# Patient Record
Sex: FEMALE | Race: WHITE | NOT HISPANIC OR LATINO | Employment: OTHER | URBAN - METROPOLITAN AREA
[De-identification: names, ages, dates, MRNs, and addresses within clinical notes are randomized per-mention and may not be internally consistent; named-entity substitution may affect disease eponyms.]

---

## 2018-10-19 DIAGNOSIS — R00.2 PALPITATION: Primary | ICD-10-CM

## 2018-10-19 RX ORDER — PROPRANOLOL HYDROCHLORIDE 40 MG/1
TABLET ORAL
Qty: 60 TABLET | Refills: 0 | Status: SHIPPED | OUTPATIENT
Start: 2018-10-19 | End: 2018-11-21 | Stop reason: SDUPTHER

## 2018-10-23 DIAGNOSIS — R00.2 PALPITATION: ICD-10-CM

## 2018-10-23 RX ORDER — PROPRANOLOL HYDROCHLORIDE 40 MG/1
40 TABLET ORAL 2 TIMES DAILY
Qty: 60 TABLET | Refills: 0 | Status: CANCELLED | OUTPATIENT
Start: 2018-10-23

## 2018-11-21 DIAGNOSIS — R00.2 PALPITATION: ICD-10-CM

## 2018-11-21 RX ORDER — PROPRANOLOL HYDROCHLORIDE 40 MG/1
TABLET ORAL
Qty: 60 TABLET | Refills: 0 | Status: SHIPPED | OUTPATIENT
Start: 2018-11-21 | End: 2018-12-19 | Stop reason: SDUPTHER

## 2018-12-19 DIAGNOSIS — R00.2 PALPITATION: ICD-10-CM

## 2018-12-19 RX ORDER — PROPRANOLOL HYDROCHLORIDE 40 MG/1
TABLET ORAL
Qty: 60 TABLET | Refills: 0 | Status: SHIPPED | OUTPATIENT
Start: 2018-12-19 | End: 2019-01-22 | Stop reason: SDUPTHER

## 2018-12-24 DIAGNOSIS — F41.9 ANXIETY: Primary | ICD-10-CM

## 2018-12-24 RX ORDER — SERTRALINE HYDROCHLORIDE 100 MG/1
100 TABLET, FILM COATED ORAL DAILY
Qty: 30 TABLET | Refills: 6 | Status: SHIPPED | OUTPATIENT
Start: 2018-12-24 | End: 2019-01-25 | Stop reason: SDUPTHER

## 2018-12-24 RX ORDER — SERTRALINE HYDROCHLORIDE 100 MG/1
100 TABLET, FILM COATED ORAL DAILY
Refills: 1 | COMMUNITY
Start: 2018-09-29 | End: 2018-12-24 | Stop reason: SDUPTHER

## 2018-12-24 NOTE — TELEPHONE ENCOUNTER
Patient called she said she needs a Rx for Zoloft 100 mg called into [facility] at [phone] pt of santiago HUERTA

## 2019-01-22 DIAGNOSIS — R00.2 PALPITATION: ICD-10-CM

## 2019-01-22 RX ORDER — PROPRANOLOL HYDROCHLORIDE 40 MG/1
TABLET ORAL
Qty: 60 TABLET | Refills: 0 | Status: SHIPPED | OUTPATIENT
Start: 2019-01-22 | End: 2019-01-25 | Stop reason: SDUPTHER

## 2019-01-22 NOTE — TELEPHONE ENCOUNTER
Please call her and advise that this is last rx until seen   Last appt that I see was 2014     I have been sending with rx but still keeps sending

## 2019-01-25 ENCOUNTER — OFFICE VISIT (OUTPATIENT)
Dept: FAMILY MEDICINE CLINIC | Facility: CLINIC | Age: 59
End: 2019-01-25
Payer: MEDICARE

## 2019-01-25 VITALS
BODY MASS INDEX: 18.77 KG/M2 | DIASTOLIC BLOOD PRESSURE: 80 MMHG | WEIGHT: 126.7 LBS | RESPIRATION RATE: 18 BRPM | HEIGHT: 69 IN | SYSTOLIC BLOOD PRESSURE: 130 MMHG | HEART RATE: 74 BPM

## 2019-01-25 DIAGNOSIS — F41.9 ANXIETY: ICD-10-CM

## 2019-01-25 DIAGNOSIS — R00.2 HEART PALPITATIONS: ICD-10-CM

## 2019-01-25 DIAGNOSIS — F17.200 TOBACCO DEPENDENCE: ICD-10-CM

## 2019-01-25 DIAGNOSIS — R00.2 PALPITATION: ICD-10-CM

## 2019-01-25 DIAGNOSIS — Z00.00 ANNUAL PHYSICAL EXAM: Primary | ICD-10-CM

## 2019-01-25 PROCEDURE — G0439 PPPS, SUBSEQ VISIT: HCPCS | Performed by: NURSE PRACTITIONER

## 2019-01-25 RX ORDER — SERTRALINE HYDROCHLORIDE 100 MG/1
100 TABLET, FILM COATED ORAL DAILY
Qty: 30 TABLET | Refills: 5 | Status: SHIPPED | OUTPATIENT
Start: 2019-01-25 | End: 2019-03-25 | Stop reason: SDUPTHER

## 2019-01-25 RX ORDER — PROPRANOLOL HYDROCHLORIDE 40 MG/1
40 TABLET ORAL 2 TIMES DAILY
Qty: 60 TABLET | Refills: 5 | Status: SHIPPED | OUTPATIENT
Start: 2019-01-25 | End: 2019-07-17 | Stop reason: SDUPTHER

## 2019-01-25 NOTE — PATIENT INSTRUCTIONS

## 2019-01-25 NOTE — PROGRESS NOTES
ADULT ANNUAL PHYSICAL  [facility]    NAME: Collette Vaughn  AGE: 60 y o  SEX: female  : 1960     DATE: 2019     Assessment and Plan:     Problem List Items Addressed This Visit     Anxiety    Heart palpitations      Other Visit Diagnoses     Annual physical exam    -  Primary    Tobacco dependence        Palpitation          WE DISCUSSED ALL HER HEALTH MAINTENANCE THAT WE SHOULD BE DOING:  LABS, MAMMOGRAM,  PAP, COLONOSCOPY BUT SHE REFUSES  ADVISED TO LET ME KNOW IF CHANGES HER MIND  STILL SMOKING AND ISN;T STOPPING   Health maintenance and preventative care screenings were discussed with patient today  Appropriate education was printed on patient's after visit summary  · Discussed risks/benefits of screening for breast cancer, cervical cancer, colorectal cancer, hepatitis c, high cholesterol and diabetes  Patient declines to screening for breast cancer, cervical cancer, colorectal cancer, hepatitis c, high cholesterol and diabetes  · Immunizations were reviewed: patient is up-to-date with her immunizations  Counseling:  · Dental Health: discussed importance of regular tooth brushing, flossing, and dental visits  No Follow-up on file  Chief Complaint:     Chief Complaint   Patient presents with    Annual Exam      History of Present Illness:     Adult Annual Physical   Patient here for a comprehensive physical exam  The patient reports problems - daughter  last year in March  Here due to anxiety and palpitations  Diet and Physical Activity  · Diet/Nutrition: fair  · Weight concerns: none, patient's BMI is between 18 5-24 9  · Exercise: no formal exercise        Depression Screening  PHQ-9 Depression Screening    PHQ-9:    Frequency of the following problems over the past two weeks:       Little interest or pleasure in doing things:  1 - several days  Feeling down, depressed, or hopeless:  1 - several days  Trouble falling or staying asleep, or sleeping too much:  1 - several days  Feeling tired or having little energy:  1 - several days  Poor appetite or overeatin - not at all  Feeling bad about yourself - or that you are a failure or have let yourself or your family down:  1 - several days  Trouble concentrating on things, such as reading the newspaper or watching television:  0 - not at all  Moving or speaking so slowly that other people could have noticed  Or the opposite - being so fidgety or restless that you have been moving around a lot more than usual:  0 - not at all  Thoughts that you would be better off dead, or of hurting yourself in some way:  0 - not at all  PHQ-2 Score:  2  PHQ-9 Score:  5       General Health  · Sleep: sleeps well  · Hearing: normal - bilateral   · Vision: no vision problems  · Dental: regular dental visits  /GYN Health  · Patient is: postmenopausal  · Last menstrual period: years ago  ·   ·      Review of Systems:     Review of Systems   Constitutional: Negative for activity change, appetite change, fatigue, fever and unexpected weight change  HENT: Negative for congestion, dental problem and sneezing  Eyes: Negative for discharge and visual disturbance  Respiratory: Negative for cough and wheezing  Gastrointestinal: Negative for abdominal pain, constipation, diarrhea, nausea and vomiting  Endocrine: Negative for polydipsia and polyuria  Genitourinary: Negative for dysuria and frequency  Musculoskeletal: Negative for arthralgias  Skin: Negative for rash  Allergic/Immunologic: Negative for environmental allergies and food allergies  Neurological: Negative for headaches  Hematological: Negative for adenopathy  Psychiatric/Behavioral: Negative for behavioral problems and sleep disturbance  The patient is nervous/anxious  Loss of daughter has been difficult         Past Medical History:     Past Medical History:   Diagnosis Date    Chronic osteoarthritis 03/17/2012      Past Surgical History:     Past Surgical History:   Procedure Laterality Date    LAPAROSCOPY        Social History:     Social History     Social History    Marital status: Single     Spouse name: N/A    Number of children: N/A    Years of education: N/A     Social History Main Topics    Smoking status: Current Every Day Smoker    Smokeless tobacco: Never Used    Alcohol use Yes      Comment: socially    Drug use: No    Sexual activity: Not Asked     Other Topics Concern    None     Social History Narrative    Consumes caffeine, coffee  Family History:     No family history on file  Current Medications:     Current Outpatient Prescriptions   Medication Sig Dispense Refill    propranolol (INDERAL) 40 mg tablet TAKE 1 TABLET BY MOUTH TWICE A DAY--APPT REQUIRED--NO FURTHER REFILLS UNTIL APPT 60 tablet 0    sertraline (ZOLOFT) 100 mg tablet Take 1 tablet (100 mg total) by mouth daily 30 tablet 6     No current facility-administered medications for this visit  Allergies: Allergies   Allergen Reactions    No Active Allergies       Objective:     /80 (BP Location: Left arm, Patient Position: Sitting, Cuff Size: Standard)   Pulse 74   Resp 18   Ht 5' 8 9" (1 75 m)   Wt 57 5 kg (126 lb 11 2 oz)   BMI 18 77 kg/m²     Physical Exam   Constitutional: She is oriented to person, place, and time  She appears well-developed and well-nourished  HENT:   Right Ear: External ear normal    Left Ear: External ear normal    Nose: Nose normal    Mouth/Throat: Oropharynx is clear and moist    Eyes: Conjunctivae are normal    Neck: Normal range of motion  Neck supple  No thyromegaly present  Cardiovascular: Normal rate, regular rhythm and normal heart sounds  Pulmonary/Chest: Effort normal and breath sounds normal    Abdominal: Soft  Bowel sounds are normal  There is no tenderness  Musculoskeletal: Normal range of motion  She exhibits no edema  Neurological: She is alert and oriented to person, place, and time  Skin: Skin is warm  Psychiatric: Her speech is normal and behavior is normal  Judgment and thought content normal  Her mood appears anxious  Cognition and memory are normal  She exhibits a depressed mood  Vitals reviewed         Health Maintenance:     Health Maintenance   Topic Date Due    Hepatitis C Screening  1960    Medicare Annual Wellness Visit (AWV)  1960    Pneumococcal PPSV23 Medium Risk Adult (1 of 1 - PPSV23) 11/03/1978    INFLUENZA VACCINE  07/01/2018    Depression Screening PHQ  01/25/2020    DTaP,Tdap,and Td Vaccines (2 - Td) 11/27/2023     Immunization History   Administered Date(s) Administered    Tdap 11/27/2013       Renaye Mcburney, CRNP  [address] [facility]

## 2019-03-25 DIAGNOSIS — F41.9 ANXIETY: ICD-10-CM

## 2019-03-26 RX ORDER — SERTRALINE HYDROCHLORIDE 100 MG/1
100 TABLET, FILM COATED ORAL DAILY
Qty: 30 TABLET | Refills: 5 | Status: SHIPPED | OUTPATIENT
Start: 2019-03-26 | End: 2020-04-13

## 2019-07-17 DIAGNOSIS — R00.2 PALPITATION: ICD-10-CM

## 2019-07-17 RX ORDER — PROPRANOLOL HYDROCHLORIDE 40 MG/1
TABLET ORAL
Qty: 180 TABLET | Refills: 1 | Status: SHIPPED | OUTPATIENT
Start: 2019-07-17 | End: 2020-01-13

## 2020-01-13 DIAGNOSIS — R00.2 PALPITATION: ICD-10-CM

## 2020-01-13 RX ORDER — PROPRANOLOL HYDROCHLORIDE 40 MG/1
TABLET ORAL
Qty: 180 TABLET | Refills: 0 | Status: SHIPPED | OUTPATIENT
Start: 2020-01-13 | End: 2020-04-14

## 2020-01-20 ENCOUNTER — DOCUMENTATION (OUTPATIENT)
Dept: FAMILY MEDICINE CLINIC | Facility: CLINIC | Age: 60
End: 2020-01-20

## 2020-03-11 ENCOUNTER — TELEPHONE (OUTPATIENT)
Dept: FAMILY MEDICINE CLINIC | Facility: CLINIC | Age: 60
End: 2020-03-11

## 2020-04-13 DIAGNOSIS — F41.9 ANXIETY: ICD-10-CM

## 2020-04-13 RX ORDER — SERTRALINE HYDROCHLORIDE 100 MG/1
TABLET, FILM COATED ORAL
Qty: 90 TABLET | Refills: 0 | Status: SHIPPED | OUTPATIENT
Start: 2020-04-13 | End: 2020-07-07

## 2020-04-14 DIAGNOSIS — R00.2 PALPITATION: ICD-10-CM

## 2020-04-14 RX ORDER — PROPRANOLOL HYDROCHLORIDE 40 MG/1
TABLET ORAL
Qty: 180 TABLET | Refills: 0 | Status: SHIPPED | OUTPATIENT
Start: 2020-04-14 | End: 2020-07-07

## 2020-05-01 ENCOUNTER — TELEMEDICINE (OUTPATIENT)
Dept: FAMILY MEDICINE CLINIC | Facility: CLINIC | Age: 60
End: 2020-05-01
Payer: MEDICARE

## 2020-05-01 ENCOUNTER — TELEPHONE (OUTPATIENT)
Dept: FAMILY MEDICINE CLINIC | Facility: CLINIC | Age: 60
End: 2020-05-01

## 2020-05-01 DIAGNOSIS — F41.9 ANXIETY: Primary | ICD-10-CM

## 2020-05-01 PROCEDURE — 99213 OFFICE O/P EST LOW 20 MIN: CPT | Performed by: NURSE PRACTITIONER

## 2020-05-01 RX ORDER — BUSPIRONE HYDROCHLORIDE 5 MG/1
5 TABLET ORAL 2 TIMES DAILY
Qty: 60 TABLET | Refills: 1 | Status: SHIPPED | OUTPATIENT
Start: 2020-05-01 | End: 2020-05-15 | Stop reason: SDUPTHER

## 2020-05-15 ENCOUNTER — TELEMEDICINE (OUTPATIENT)
Dept: FAMILY MEDICINE CLINIC | Facility: CLINIC | Age: 60
End: 2020-05-15
Payer: MEDICARE

## 2020-05-15 VITALS — TEMPERATURE: 98.4 F

## 2020-05-15 DIAGNOSIS — F41.9 ANXIETY: ICD-10-CM

## 2020-05-15 PROCEDURE — 99213 OFFICE O/P EST LOW 20 MIN: CPT | Performed by: NURSE PRACTITIONER

## 2020-05-15 RX ORDER — BUSPIRONE HYDROCHLORIDE 5 MG/1
TABLET ORAL
Qty: 90 TABLET | Refills: 1 | Status: SHIPPED | OUTPATIENT
Start: 2020-05-15 | End: 2020-05-24 | Stop reason: SDUPTHER

## 2020-05-24 DIAGNOSIS — F41.9 ANXIETY: ICD-10-CM

## 2020-05-24 RX ORDER — BUSPIRONE HYDROCHLORIDE 5 MG/1
TABLET ORAL
Qty: 60 TABLET | Refills: 1 | Status: SHIPPED | OUTPATIENT
Start: 2020-05-24 | End: 2020-06-12 | Stop reason: SDUPTHER

## 2020-06-12 ENCOUNTER — TELEMEDICINE (OUTPATIENT)
Dept: FAMILY MEDICINE CLINIC | Facility: CLINIC | Age: 60
End: 2020-06-12
Payer: MEDICARE

## 2020-06-12 VITALS — TEMPERATURE: 97.1 F

## 2020-06-12 DIAGNOSIS — F41.9 ANXIETY: ICD-10-CM

## 2020-06-12 PROCEDURE — 99214 OFFICE O/P EST MOD 30 MIN: CPT | Performed by: NURSE PRACTITIONER

## 2020-06-12 RX ORDER — BUSPIRONE HYDROCHLORIDE 5 MG/1
TABLET ORAL
Qty: 90 TABLET | Refills: 1 | Status: SHIPPED | OUTPATIENT
Start: 2020-06-12 | End: 2020-07-04

## 2020-06-12 RX ORDER — BUSPIRONE HYDROCHLORIDE 5 MG/1
TABLET ORAL
Qty: 60 TABLET | Refills: 1
Start: 2020-06-12 | End: 2020-06-12

## 2020-07-04 DIAGNOSIS — F41.9 ANXIETY: ICD-10-CM

## 2020-07-04 RX ORDER — BUSPIRONE HYDROCHLORIDE 5 MG/1
TABLET ORAL
Qty: 60 TABLET | Refills: 1 | Status: SHIPPED | OUTPATIENT
Start: 2020-07-04 | End: 2020-07-22

## 2020-07-07 DIAGNOSIS — F41.9 ANXIETY: ICD-10-CM

## 2020-07-07 DIAGNOSIS — R00.2 PALPITATION: ICD-10-CM

## 2020-07-07 RX ORDER — SERTRALINE HYDROCHLORIDE 100 MG/1
TABLET, FILM COATED ORAL
Qty: 90 TABLET | Refills: 0 | Status: SHIPPED | OUTPATIENT
Start: 2020-07-07 | End: 2020-10-03 | Stop reason: SDUPTHER

## 2020-07-07 RX ORDER — PROPRANOLOL HYDROCHLORIDE 40 MG/1
TABLET ORAL
Qty: 180 TABLET | Refills: 0 | Status: SHIPPED | OUTPATIENT
Start: 2020-07-07 | End: 2020-10-03 | Stop reason: SDUPTHER

## 2020-07-22 DIAGNOSIS — F41.9 ANXIETY: ICD-10-CM

## 2020-07-22 RX ORDER — BUSPIRONE HYDROCHLORIDE 5 MG/1
TABLET ORAL
Qty: 90 TABLET | Refills: 1 | Status: SHIPPED | OUTPATIENT
Start: 2020-07-22 | End: 2020-08-25

## 2020-08-24 DIAGNOSIS — F41.9 ANXIETY: ICD-10-CM

## 2020-08-25 RX ORDER — BUSPIRONE HYDROCHLORIDE 5 MG/1
TABLET ORAL
Qty: 90 TABLET | Refills: 1 | Status: SHIPPED | OUTPATIENT
Start: 2020-08-25 | End: 2020-09-23

## 2020-09-23 DIAGNOSIS — F41.9 ANXIETY: ICD-10-CM

## 2020-09-23 RX ORDER — BUSPIRONE HYDROCHLORIDE 5 MG/1
TABLET ORAL
Qty: 90 TABLET | Refills: 1 | Status: SHIPPED | OUTPATIENT
Start: 2020-09-23 | End: 2020-10-24 | Stop reason: SDUPTHER

## 2020-10-03 DIAGNOSIS — R00.2 PALPITATION: ICD-10-CM

## 2020-10-03 DIAGNOSIS — F41.9 ANXIETY: ICD-10-CM

## 2020-10-03 RX ORDER — SERTRALINE HYDROCHLORIDE 100 MG/1
TABLET, FILM COATED ORAL
Qty: 90 TABLET | Refills: 0 | Status: SHIPPED | OUTPATIENT
Start: 2020-10-03 | End: 2021-01-06

## 2020-10-03 RX ORDER — PROPRANOLOL HYDROCHLORIDE 40 MG/1
TABLET ORAL
Qty: 180 TABLET | Refills: 0 | Status: SHIPPED | OUTPATIENT
Start: 2020-10-03 | End: 2021-01-06

## 2020-10-24 DIAGNOSIS — F41.9 ANXIETY: ICD-10-CM

## 2020-10-24 RX ORDER — BUSPIRONE HYDROCHLORIDE 5 MG/1
TABLET ORAL
Qty: 90 TABLET | Refills: 1 | Status: SHIPPED | OUTPATIENT
Start: 2020-10-24 | End: 2020-11-18

## 2020-11-18 DIAGNOSIS — F41.9 ANXIETY: ICD-10-CM

## 2020-11-18 RX ORDER — BUSPIRONE HYDROCHLORIDE 5 MG/1
TABLET ORAL
Qty: 90 TABLET | Refills: 1 | Status: SHIPPED | OUTPATIENT
Start: 2020-11-18 | End: 2020-12-23 | Stop reason: SDUPTHER

## 2020-12-23 DIAGNOSIS — F41.9 ANXIETY: ICD-10-CM

## 2020-12-23 RX ORDER — BUSPIRONE HYDROCHLORIDE 5 MG/1
TABLET ORAL
Qty: 90 TABLET | Refills: 1 | Status: SHIPPED | OUTPATIENT
Start: 2020-12-23 | End: 2021-01-27

## 2021-01-06 DIAGNOSIS — F41.9 ANXIETY: ICD-10-CM

## 2021-01-06 DIAGNOSIS — R00.2 PALPITATION: ICD-10-CM

## 2021-01-06 RX ORDER — PROPRANOLOL HYDROCHLORIDE 40 MG/1
TABLET ORAL
Qty: 180 TABLET | Refills: 0 | Status: SHIPPED | OUTPATIENT
Start: 2021-01-06 | End: 2021-01-12 | Stop reason: SDUPTHER

## 2021-01-06 RX ORDER — SERTRALINE HYDROCHLORIDE 100 MG/1
TABLET, FILM COATED ORAL
Qty: 90 TABLET | Refills: 0 | Status: SHIPPED | OUTPATIENT
Start: 2021-01-06 | End: 2021-04-07

## 2021-01-12 DIAGNOSIS — R00.2 PALPITATION: ICD-10-CM

## 2021-01-12 RX ORDER — PROPRANOLOL HYDROCHLORIDE 40 MG/1
40 TABLET ORAL 2 TIMES DAILY
Qty: 180 TABLET | Refills: 0 | Status: SHIPPED | OUTPATIENT
Start: 2021-01-12 | End: 2021-07-27

## 2021-01-27 DIAGNOSIS — F41.9 ANXIETY: ICD-10-CM

## 2021-01-27 RX ORDER — BUSPIRONE HYDROCHLORIDE 5 MG/1
TABLET ORAL
Qty: 90 TABLET | Refills: 1 | Status: SHIPPED | OUTPATIENT
Start: 2021-01-27 | End: 2021-02-21 | Stop reason: SDUPTHER

## 2021-02-21 DIAGNOSIS — F41.9 ANXIETY: ICD-10-CM

## 2021-02-21 RX ORDER — BUSPIRONE HYDROCHLORIDE 5 MG/1
TABLET ORAL
Qty: 90 TABLET | Refills: 1 | Status: SHIPPED | OUTPATIENT
Start: 2021-02-21 | End: 2021-03-25

## 2021-03-25 DIAGNOSIS — F41.9 ANXIETY: ICD-10-CM

## 2021-03-25 RX ORDER — BUSPIRONE HYDROCHLORIDE 5 MG/1
TABLET ORAL
Qty: 90 TABLET | Refills: 1 | Status: SHIPPED | OUTPATIENT
Start: 2021-03-25 | End: 2021-04-20

## 2021-04-07 DIAGNOSIS — F41.9 ANXIETY: ICD-10-CM

## 2021-04-07 RX ORDER — SERTRALINE HYDROCHLORIDE 100 MG/1
TABLET, FILM COATED ORAL
Qty: 90 TABLET | Refills: 0 | Status: SHIPPED | OUTPATIENT
Start: 2021-04-07 | End: 2021-07-03 | Stop reason: SDUPTHER

## 2021-04-20 DIAGNOSIS — F41.9 ANXIETY: ICD-10-CM

## 2021-04-20 RX ORDER — BUSPIRONE HYDROCHLORIDE 5 MG/1
TABLET ORAL
Qty: 90 TABLET | Refills: 1 | Status: SHIPPED | OUTPATIENT
Start: 2021-04-20 | End: 2021-05-18

## 2021-05-18 DIAGNOSIS — F41.9 ANXIETY: ICD-10-CM

## 2021-05-18 RX ORDER — BUSPIRONE HYDROCHLORIDE 5 MG/1
TABLET ORAL
Qty: 90 TABLET | Refills: 1 | Status: SHIPPED | OUTPATIENT
Start: 2021-05-18 | End: 2021-06-17

## 2021-06-17 DIAGNOSIS — F41.9 ANXIETY: ICD-10-CM

## 2021-06-17 RX ORDER — BUSPIRONE HYDROCHLORIDE 5 MG/1
TABLET ORAL
Qty: 90 TABLET | Refills: 1 | Status: SHIPPED | OUTPATIENT
Start: 2021-06-17 | End: 2021-07-14

## 2021-07-03 DIAGNOSIS — F41.9 ANXIETY: ICD-10-CM

## 2021-07-03 RX ORDER — SERTRALINE HYDROCHLORIDE 100 MG/1
TABLET, FILM COATED ORAL
Qty: 90 TABLET | Refills: 0 | Status: SHIPPED | OUTPATIENT
Start: 2021-07-03 | End: 2021-10-04

## 2021-07-27 DIAGNOSIS — R00.2 PALPITATION: ICD-10-CM

## 2021-07-27 RX ORDER — PROPRANOLOL HYDROCHLORIDE 40 MG/1
TABLET ORAL
Qty: 180 TABLET | Refills: 0 | Status: SHIPPED | OUTPATIENT
Start: 2021-07-27 | End: 2021-10-21

## 2021-08-18 DIAGNOSIS — F41.9 ANXIETY: ICD-10-CM

## 2021-08-18 RX ORDER — BUSPIRONE HYDROCHLORIDE 5 MG/1
TABLET ORAL
Qty: 30 TABLET | Refills: 0 | Status: SHIPPED | OUTPATIENT
Start: 2021-08-18 | End: 2021-10-01 | Stop reason: ALTCHOICE

## 2021-09-20 ENCOUNTER — TELEPHONE (OUTPATIENT)
Dept: FAMILY MEDICINE CLINIC | Facility: CLINIC | Age: 61
End: 2021-09-20

## 2021-09-23 ENCOUNTER — TELEPHONE (OUTPATIENT)
Dept: FAMILY MEDICINE CLINIC | Facility: CLINIC | Age: 61
End: 2021-09-23

## 2021-09-23 NOTE — TELEPHONE ENCOUNTER
Tried to contact pt due to electric out in office   Was unable to call and leave message   Also called via javier   She didn't     Will office call and reschedule her

## 2021-10-01 ENCOUNTER — OFFICE VISIT (OUTPATIENT)
Dept: FAMILY MEDICINE CLINIC | Facility: CLINIC | Age: 61
End: 2021-10-01
Payer: MEDICARE

## 2021-10-01 VITALS
OXYGEN SATURATION: 99 % | HEIGHT: 67 IN | HEART RATE: 70 BPM | BODY MASS INDEX: 21.91 KG/M2 | SYSTOLIC BLOOD PRESSURE: 142 MMHG | WEIGHT: 139.6 LBS | TEMPERATURE: 97.8 F | DIASTOLIC BLOOD PRESSURE: 78 MMHG

## 2021-10-01 DIAGNOSIS — M19.90 CHRONIC OSTEOARTHRITIS: ICD-10-CM

## 2021-10-01 DIAGNOSIS — G89.29 CHRONIC BILATERAL LOW BACK PAIN WITH LEFT-SIDED SCIATICA: ICD-10-CM

## 2021-10-01 DIAGNOSIS — Z11.59 NEED FOR HEPATITIS C SCREENING TEST: ICD-10-CM

## 2021-10-01 DIAGNOSIS — Z12.11 SCREENING FOR COLORECTAL CANCER: ICD-10-CM

## 2021-10-01 DIAGNOSIS — F41.9 ANXIETY: ICD-10-CM

## 2021-10-01 DIAGNOSIS — E78.9 BORDERLINE HIGH CHOLESTEROL: ICD-10-CM

## 2021-10-01 DIAGNOSIS — Z12.12 SCREENING FOR COLORECTAL CANCER: ICD-10-CM

## 2021-10-01 DIAGNOSIS — R53.83 OTHER FATIGUE: ICD-10-CM

## 2021-10-01 DIAGNOSIS — Z00.00 MEDICARE ANNUAL WELLNESS VISIT, SUBSEQUENT: Primary | ICD-10-CM

## 2021-10-01 DIAGNOSIS — Z12.31 ENCOUNTER FOR SCREENING MAMMOGRAM FOR MALIGNANT NEOPLASM OF BREAST: ICD-10-CM

## 2021-10-01 DIAGNOSIS — Z00.00 WELL ADULT HEALTH CHECK: ICD-10-CM

## 2021-10-01 DIAGNOSIS — Z11.4 SCREENING FOR HIV (HUMAN IMMUNODEFICIENCY VIRUS): ICD-10-CM

## 2021-10-01 DIAGNOSIS — M54.42 CHRONIC BILATERAL LOW BACK PAIN WITH LEFT-SIDED SCIATICA: ICD-10-CM

## 2021-10-01 PROBLEM — Z87.39 HISTORY OF HERNIATED INTERVERTEBRAL DISC: Status: ACTIVE | Noted: 2021-10-01

## 2021-10-01 PROCEDURE — 99214 OFFICE O/P EST MOD 30 MIN: CPT | Performed by: NURSE PRACTITIONER

## 2021-10-01 PROCEDURE — G0439 PPPS, SUBSEQ VISIT: HCPCS | Performed by: NURSE PRACTITIONER

## 2021-10-03 DIAGNOSIS — F41.9 ANXIETY: ICD-10-CM

## 2021-10-04 RX ORDER — SERTRALINE HYDROCHLORIDE 100 MG/1
TABLET, FILM COATED ORAL
Qty: 90 TABLET | Refills: 0 | Status: SHIPPED | OUTPATIENT
Start: 2021-10-04 | End: 2022-01-05

## 2021-10-15 ENCOUNTER — NURSE TRIAGE (OUTPATIENT)
Dept: PHYSICAL THERAPY | Facility: OTHER | Age: 61
End: 2021-10-15

## 2021-10-15 DIAGNOSIS — M54.50 ACUTE EXACERBATION OF CHRONIC LOW BACK PAIN: Primary | ICD-10-CM

## 2021-10-15 DIAGNOSIS — G89.29 ACUTE EXACERBATION OF CHRONIC LOW BACK PAIN: Primary | ICD-10-CM

## 2021-10-20 ENCOUNTER — HOSPITAL ENCOUNTER (OUTPATIENT)
Dept: RADIOLOGY | Facility: HOSPITAL | Age: 61
Discharge: HOME | End: 2021-10-20
Payer: MEDICARE

## 2021-10-20 DIAGNOSIS — G89.29 CHRONIC BILATERAL LOW BACK PAIN WITH LEFT-SIDED SCIATICA: ICD-10-CM

## 2021-10-20 DIAGNOSIS — M54.42 CHRONIC BILATERAL LOW BACK PAIN WITH LEFT-SIDED SCIATICA: ICD-10-CM

## 2021-10-20 PROCEDURE — G1004 CDSM NDSC: HCPCS

## 2021-10-20 PROCEDURE — 72148 MRI LUMBAR SPINE W/O DYE: CPT

## 2021-10-21 DIAGNOSIS — R00.2 PALPITATION: ICD-10-CM

## 2021-10-21 RX ORDER — PROPRANOLOL HYDROCHLORIDE 40 MG/1
TABLET ORAL
Qty: 180 TABLET | Refills: 0 | Status: SHIPPED | OUTPATIENT
Start: 2021-10-21 | End: 2022-01-12

## 2021-11-01 ENCOUNTER — TELEPHONE (OUTPATIENT)
Dept: PHYSICAL THERAPY | Facility: OTHER | Age: 61
End: 2021-11-01

## 2021-11-19 ENCOUNTER — CONSULT (OUTPATIENT)
Dept: FAMILY MEDICINE CLINIC | Facility: CLINIC | Age: 61
End: 2021-11-19

## 2021-11-19 VITALS
BODY MASS INDEX: 22.22 KG/M2 | HEIGHT: 67 IN | OXYGEN SATURATION: 99 % | SYSTOLIC BLOOD PRESSURE: 138 MMHG | HEART RATE: 65 BPM | WEIGHT: 141.6 LBS | DIASTOLIC BLOOD PRESSURE: 70 MMHG

## 2021-11-19 DIAGNOSIS — I73.9 PERIPHERAL VASCULAR DISEASE (HCC): Primary | ICD-10-CM

## 2021-11-19 PROCEDURE — PREOP: Performed by: FAMILY MEDICINE

## 2021-11-19 RX ORDER — ASPIRIN 81 MG/1
81 TABLET ORAL
COMMUNITY

## 2021-11-19 RX ORDER — ATORVASTATIN CALCIUM 40 MG/1
40 TABLET, FILM COATED ORAL DAILY
COMMUNITY
Start: 2021-10-18 | End: 2022-10-18

## 2022-01-05 DIAGNOSIS — F41.9 ANXIETY: ICD-10-CM

## 2022-01-05 RX ORDER — SERTRALINE HYDROCHLORIDE 100 MG/1
TABLET, FILM COATED ORAL
Qty: 90 TABLET | Refills: 0 | Status: SHIPPED | OUTPATIENT
Start: 2022-01-05 | End: 2022-04-07

## 2022-01-12 DIAGNOSIS — R00.2 PALPITATION: ICD-10-CM

## 2022-01-12 RX ORDER — PROPRANOLOL HYDROCHLORIDE 40 MG/1
TABLET ORAL
Qty: 180 TABLET | Refills: 0 | Status: SHIPPED | OUTPATIENT
Start: 2022-01-12 | End: 2022-04-22

## 2022-02-17 ENCOUNTER — APPOINTMENT (OUTPATIENT)
Dept: RADIOLOGY | Facility: MEDICAL CENTER | Age: 62
End: 2022-02-17
Payer: MEDICARE

## 2022-02-17 ENCOUNTER — APPOINTMENT (OUTPATIENT)
Dept: LAB | Facility: MEDICAL CENTER | Age: 62
End: 2022-02-17
Payer: MEDICARE

## 2022-02-17 ENCOUNTER — CONSULT (OUTPATIENT)
Dept: RHEUMATOLOGY | Facility: CLINIC | Age: 62
End: 2022-02-17
Payer: MEDICARE

## 2022-02-17 VITALS
DIASTOLIC BLOOD PRESSURE: 77 MMHG | HEIGHT: 67 IN | SYSTOLIC BLOOD PRESSURE: 147 MMHG | HEART RATE: 58 BPM | WEIGHT: 137 LBS | BODY MASS INDEX: 21.5 KG/M2 | TEMPERATURE: 97.5 F

## 2022-02-17 DIAGNOSIS — M19.90 ARTHRITIS: ICD-10-CM

## 2022-02-17 DIAGNOSIS — I73.9 PAD (PERIPHERAL ARTERY DISEASE) (HCC): ICD-10-CM

## 2022-02-17 DIAGNOSIS — E55.9 VITAMIN D DEFICIENCY: ICD-10-CM

## 2022-02-17 DIAGNOSIS — M19.90 CHRONIC OSTEOARTHRITIS: Primary | ICD-10-CM

## 2022-02-17 DIAGNOSIS — M89.9 DISORDER OF BONE, UNSPECIFIED: ICD-10-CM

## 2022-02-17 DIAGNOSIS — M19.90 CHRONIC OSTEOARTHRITIS: ICD-10-CM

## 2022-02-17 DIAGNOSIS — Z11.59 ENCOUNTER FOR SCREENING FOR OTHER VIRAL DISEASES: ICD-10-CM

## 2022-02-17 DIAGNOSIS — M79.10 MYALGIA: ICD-10-CM

## 2022-02-17 LAB
25(OH)D3 SERPL-MCNC: 11.8 NG/ML (ref 30–100)
ALBUMIN SERPL BCP-MCNC: 4.2 G/DL (ref 3.5–5)
ALP SERPL-CCNC: 128 U/L (ref 46–116)
ALT SERPL W P-5'-P-CCNC: 37 U/L (ref 12–78)
AST SERPL W P-5'-P-CCNC: 28 U/L (ref 5–45)
BILIRUB DIRECT SERPL-MCNC: 0.1 MG/DL (ref 0–0.2)
BILIRUB SERPL-MCNC: 0.45 MG/DL (ref 0.2–1)
CRP SERPL QL: 3.1 MG/L
ERYTHROCYTE [SEDIMENTATION RATE] IN BLOOD: 30 MM/HOUR (ref 0–29)
HBV CORE AB SER QL: NORMAL
HBV CORE IGM SER QL: NORMAL
HBV SURFACE AG SER QL: NORMAL
HCV AB SER QL: NORMAL
PROT SERPL-MCNC: 8.2 G/DL (ref 6.4–8.2)

## 2022-02-17 PROCEDURE — 86803 HEPATITIS C AB TEST: CPT | Performed by: INTERNAL MEDICINE

## 2022-02-17 PROCEDURE — 73562 X-RAY EXAM OF KNEE 3: CPT

## 2022-02-17 PROCEDURE — 82306 VITAMIN D 25 HYDROXY: CPT | Performed by: INTERNAL MEDICINE

## 2022-02-17 PROCEDURE — 86140 C-REACTIVE PROTEIN: CPT | Performed by: INTERNAL MEDICINE

## 2022-02-17 PROCEDURE — 73130 X-RAY EXAM OF HAND: CPT

## 2022-02-17 PROCEDURE — 86704 HEP B CORE ANTIBODY TOTAL: CPT | Performed by: INTERNAL MEDICINE

## 2022-02-17 PROCEDURE — 99204 OFFICE O/P NEW MOD 45 MIN: CPT | Performed by: INTERNAL MEDICINE

## 2022-02-17 PROCEDURE — 87340 HEPATITIS B SURFACE AG IA: CPT | Performed by: INTERNAL MEDICINE

## 2022-02-17 PROCEDURE — 86705 HEP B CORE ANTIBODY IGM: CPT | Performed by: INTERNAL MEDICINE

## 2022-02-17 PROCEDURE — 80076 HEPATIC FUNCTION PANEL: CPT | Performed by: INTERNAL MEDICINE

## 2022-02-17 PROCEDURE — 85652 RBC SED RATE AUTOMATED: CPT | Performed by: INTERNAL MEDICINE

## 2022-02-17 PROCEDURE — 86200 CCP ANTIBODY: CPT | Performed by: INTERNAL MEDICINE

## 2022-02-17 PROCEDURE — 36415 COLL VENOUS BLD VENIPUNCTURE: CPT | Performed by: INTERNAL MEDICINE

## 2022-02-17 PROCEDURE — 86430 RHEUMATOID FACTOR TEST QUAL: CPT | Performed by: INTERNAL MEDICINE

## 2022-02-17 RX ORDER — NAPROXEN 500 MG/1
500 TABLET ORAL 2 TIMES DAILY WITH MEALS
Qty: 60 TABLET | Refills: 6 | Status: SHIPPED | OUTPATIENT
Start: 2022-02-17

## 2022-02-17 NOTE — PROGRESS NOTES
Assessment and Plan:   Ryan Bynum is a 57 y o   female who presents as a Rheumatology consult referred by SUSANA Lucio for evaluation of OA  Patient was diagnosed with OA more than 10 years ago  Pain in the fingers, knees, and hips is getting worse  Patient is taking ibuprofen OTC 2 tablets daily  Patient has not seen PT  Patient has not had recent XR of her hands and knees  Schedule appointment with comprehensive spine for back pain  Stop ibuprofen, start naproxen 500mg twice a day as needed for joint pain, take with food  Inflammatory arthritis lab work-up ordered, which returned unremarkable  Hand and knee x-rays, which returned unremarkable  Hand therapy referral made    Believe patient only has osteoarthritis  Also has Vit  D deficiency, for which ergocalciferol 50,000 units po weekly was prescribed     Plan:  Diagnoses and all orders for this visit:    Chronic osteoarthritis  -     Ambulatory referral to Rheumatology  -     XR hand 3+ vw right; Future  -     XR hand 3+ vw left; Future  -     XR knee 3 vw left non injury; Future  -     XR knee 3 vw right non injury; Future  -     naproxen (NAPROSYN) 500 mg tablet; Take 1 tablet (500 mg total) by mouth 2 (two) times a day with meals  -     Ambulatory referral to PT/OT hand therapy; Future    Arthritis  -     XR hand 3+ vw right; Future  -     XR hand 3+ vw left; Future  -     XR knee 3 vw left non injury; Future  -     XR knee 3 vw right non injury; Future  -     Chronic Hepatitis Panel  -     Hepatic function panel  -     C-reactive protein  -     Sedimentation rate, automated  -     RF Screen w/ Reflex to Titer  -     Cyclic citrul peptide antibody, IgG  -     naproxen (NAPROSYN) 500 mg tablet; Take 1 tablet (500 mg total) by mouth 2 (two) times a day with meals  -     Ambulatory referral to PT/OT hand therapy;  Future  -     Vitamin D 25 hydroxy    [facility]    Encounter for screening for other viral diseases -     Chronic Hepatitis Panel    Myalgia  -     Vitamin D 25 hydroxy    Disorder of bone, unspecified   -     Vitamin D 25 hydroxy  -     ergocalciferol (VITAMIN D2) 50,000 units; Take 1 capsule (50,000 Units total) by mouth once a week    Vitamin D deficiency  -     ergocalciferol (VITAMIN D2) 50,000 units; Take 1 capsule (50,000 Units total) by mouth once a week    Follow-up plan: Return to clinic in 4 months        HPI  Jyoti Márquez is a 57 y o   female who presents as a Rheumatology consult referred by SUSANA Kelly for evaluation of osteoarthritis of Fingers, Knees, and hips  She has OA for more than 10 years, and worsened in the past 3 - 4 years  Patient has not seen PT in the past  Patient is taking Ibuprofen 2 tablets daily, compression sleeves  Patient said she needs help now for management  Pain in hands is constant  Pain gets worse in the afternoon in her hips  Patient denies dropping things in her hands  Patient also complained of aching back pain  Patient has injections in her knee in the past, and it helped a lot  Hx of fractures in foot and ribs  Patient is not taking Vitamin D  Patient has PDA in the left leg s/p angioplasty, she is a former smoker  Review of Systems  Review of Systems   Constitutional: Negative for chills and fever  HENT: Negative for congestion, rhinorrhea, sneezing and sore throat  Eyes: Negative for pain and discharge  Respiratory: Negative for cough, chest tightness, shortness of breath and wheezing  Cardiovascular: Negative for chest pain and leg swelling  Gastrointestinal: Negative for abdominal pain, nausea and vomiting  Endocrine: Negative for polydipsia, polyphagia and polyuria  Genitourinary: Negative for flank pain, frequency and urgency  Musculoskeletal: Positive for arthralgias, back pain, joint swelling and neck pain  Skin: Negative for color change and pallor     Neurological: Negative for dizziness, weakness, light-headedness and headaches  Psychiatric/Behavioral: Negative for agitation and confusion  Reviewed and agree  Allergies  Allergies   Allergen Reactions    No Active Allergies        Home Medications    Current Outpatient Medications:     aspirin (ECOTRIN LOW STRENGTH) 81 mg EC tablet, Take 81 mg by mouth, Disp: , Rfl:     atorvastatin (LIPITOR) 40 mg tablet, Take 40 mg by mouth daily, Disp: , Rfl:     ergocalciferol (VITAMIN D2) 50,000 units, Take 1 capsule (50,000 Units total) by mouth once a week, Disp: 12 capsule, Rfl: 1    naproxen (NAPROSYN) 500 mg tablet, Take 1 tablet (500 mg total) by mouth 2 (two) times a day with meals, Disp: 60 tablet, Rfl: 6    propranolol (INDERAL) 40 mg tablet, TAKE 1 TABLET BY MOUTH TWICE A DAY, Disp: 180 tablet, Rfl: 0    sertraline (ZOLOFT) 100 mg tablet, TAKE 1 TABLET BY MOUTH EVERY DAY, Disp: 90 tablet, Rfl: 0    Past Medical History  Past Medical History:   Diagnosis Date    Chronic osteoarthritis 03/17/2012       Past Surgical History   Past Surgical History:   Procedure Laterality Date    LAPAROSCOPY         Family History  History reviewed  No pertinent family history  No known family history of autoimmune or inflammatory diseases  Social History  Occupation: , currently on disability  Social History     Substance and Sexual Activity   Alcohol Use Yes    Comment: socially     Social History     Substance and Sexual Activity   Drug Use Yes    Types: Marijuana     Social History     Tobacco Use   Smoking Status Current Every Day Smoker    Packs/day: 0 50    Years: 35 00    Pack years: 17 50    Types: Cigarettes   Smokeless Tobacco Current User       Objective:  Vitals:    02/17/22 0856   BP: 147/77   Pulse: 58   Temp: 97 5 °F (36 4 °C)   Weight: 62 1 kg (137 lb)   Height: 5' 7" (1 702 m)       Physical Exam  Constitutional:       General: She is not in acute distress  Appearance: She is well-developed  She is not diaphoretic     HENT: Head: Normocephalic  Mouth/Throat:      Pharynx: No oropharyngeal exudate  Eyes:      Pupils: Pupils are equal, round, and reactive to light  Cardiovascular:      Rate and Rhythm: Normal rate and regular rhythm  Heart sounds: No murmur heard  Pulmonary:      Effort: Pulmonary effort is normal  No respiratory distress  Breath sounds: No wheezing or rales  Abdominal:      General: Bowel sounds are normal       Palpations: Abdomen is soft  Tenderness: There is no abdominal tenderness  Musculoskeletal:         General: Tenderness (in hands, elbow and thoracic spine and tailbone) present  Normal range of motion  Cervical back: Normal range of motion  Skin:     General: Skin is warm  Neurological:      Mental Status: She is alert and oriented to person, place, and time  Musculoskeletal--Peripheral Joint Exam:  Physical Exam    Joint Exam 02/17/2022        Right  Left   Elbow   Tender   Tender   MCP 2  Swollen Tender   Tender   MCP 3  Swollen Tender   Tender   MCP 4   Tender   Tender   MCP 5   Tender   Tender   IP   Tender   Tender   PIP 2   Tender   Tender   PIP 3   Tender   Tender   PIP 4   Tender   Tender   PIP 5   Tender   Tender   Knee      Tender     PRASAD-28 (ESR): 5 76 (High disease activity)  PRASAD-28 (CRP): 4 85 (Moderate disease activity)      Reviewed labs and imaging  Imaging:   MRI lumbar spine 10/20/21  Multilevel degenerative changes of lumbar spine with varying degrees of canal stenosis (multilevel mild, worse at L4-L5) and foraminal narrowing (mild-to-moderate right L4-L5), as detailed above      Labs:   Consult on 02/17/2022   Component Date Value Ref Range Status    Hepatitis B Surface Ag 02/17/2022 Non-reactive  Non-reactive, NonReactive - Confirmed Final    Hepatitis C Ab 02/17/2022 Non-reactive  Non-reactive Final    Hep B C IgM 02/17/2022 Non-reactive  Non-reactive Final    Hep B Core Total Ab 02/17/2022 Non-reactive  Non-reactive Final    Total Bilirubin 02/17/2022 0 45  0 20 - 1 00 mg/dL Final    Use of this assay is not recommended for patients undergoing treatment with eltrombopag due to the potential for falsely elevated results   Bilirubin, Direct 02/17/2022 0 10  0 00 - 0 20 mg/dL Final    Alkaline Phosphatase 02/17/2022 128* 46 - 116 U/L Final    AST 02/17/2022 28  5 - 45 U/L Final    Specimen collection should occur prior to Sulfasalazine and/or Sulfapyridine administration due to the potential for falsely depressed results   ALT 02/17/2022 37  12 - 78 U/L Final    Specimen collection should occur prior to Sulfasalazine and/or Sulfapyridine administration due to the potential for falsely depressed results       Total Protein 02/17/2022 8 2  6 4 - 8 2 g/dL Final    Albumin 02/17/2022 4 2  3 5 - 5 0 g/dL Final    CRP 02/17/2022 3 1* <3 0 mg/L Final    Sed Rate 02/17/2022 30* 0 - 29 mm/hour Final    Rheumatoid Factor 02/17/2022 Negative  Negative Final    Cyclic Citrullinated Peptide Ab  02/17/2022 1 5  See comment Final    Vit D, 25-Hydroxy 02/17/2022 11 8* 30 0 - 100 0 ng/mL Final

## 2022-02-17 NOTE — PATIENT INSTRUCTIONS
Schedule appointment with [facility] for back pain  Stop ibuprofen, start naproxen 500mg twice a day as needed for joint pain, take with food  Do labs  Do hand and knee x-rays  Hand therapy referral made    Return to clinic in 4 months    Osteoarthritis   AMBULATORY CARE:   Osteoarthritis  occurs when cartilage (tissue that cushions a joint) wears away slowly and causes the bones to rub together  Osteoarthritis (OA) is a long-term condition that often affects the hands, neck, lower back, knees, and hips  OA is also called arthrosis or degenerative joint disease  Common signs and symptoms include the following:   · Joint pain that gets worse when you move the joint     · Joint stiffness that decreases after you move the joint     · Decreased range of movement     · Hard, bony enlargement on your fingers or toes    · A grinding or cracking sound when you move your joint    Call your doctor or specialist if:   · You have severe pain  · You cannot move your joint  · You have a fever  · Your joint is red and tender  · You have questions or concerns about your condition or care  Treatment for osteoarthritis  may include any of the following:  · Acetaminophen  decreases pain and fever  It is available without a doctor's order  Ask how much to take and how often to take it  Follow directions  Read the labels of all other medicines you are using to see if they also contain acetaminophen, or ask your doctor or pharmacist  Acetaminophen can cause liver damage if not taken correctly  Do not use more than 4 grams (4,000 milligrams) total of acetaminophen in one day  · NSAIDs , such as ibuprofen, help decrease swelling, pain, and fever  This medicine is available with or without a doctor's order  NSAIDs can cause stomach bleeding or kidney problems in certain people  If you take blood thinner medicine, always ask your healthcare provider if NSAIDs are safe for you   Always read the medicine label and follow directions  · Capsaicin cream  may help decrease pain in your joint  · Prescription pain medicine  may be given  Ask your healthcare provider how to take this medicine safely  Some prescription pain medicines contain acetaminophen  Do not take other medicines that contain acetaminophen without talking to your healthcare provider  Too much acetaminophen may cause liver damage  Prescription pain medicine may cause constipation  Ask your healthcare provider how to prevent or treat constipation  · A steroid injection  may be given if your symptoms get worse  · Physical therapy  is used to teach you exercises to help improve movement and strength, and to decrease pain  A physical therapist may move an area with his or her hands  For example, he or she may move your leg in certain ways to treat osteoarthritis in your hip  · Ultrasound  may be used to treat osteoarthritis in certain areas, such as your knee  Ultrasound produces heat that can relieve pain  · Surgery  may be needed if other treatments do not work  Manage your symptoms:   · Stay active  Physical activity may reduce your pain and improve your ability to do daily activities  Avoid activities that cause pain  Ask your healthcare provider what type of exercise would be best for you  · Maintain a healthy weight  This helps decrease the strain on the joints in your back, hips, knees, ankles, and feet  Ask your healthcare provider what a healthy weight is for you  He or she can help you create a weight loss plan if you are overweight  · Use heat or ice on your joints as directed  Heat and ice help decrease pain, swelling, and muscle spasms  For heat, use a heating pad on a low setting for 20 minutes, or take a warm bath  For ice, use an ice pack, or put crushed ice in a plastic bag  Cover it with a towel before you place it on your joint  Use ice for 15 minutes every hour  · Massage the muscles around the joint    Massage helps relieve pain and stiffness  Your healthcare provider or a physical therapist can show you how to do this  If you have hip OA, another person may need to help you massage the area  · Use a cane, crutches, or a walker if directed  These help protect and relieve pressure on your ankle, knee, and hip joints  You may also be prescribed shoe inserts to decrease pressure in your joints  · Wear flat or low-heeled shoes  This will help decrease pain and reduce pressure on your ankle, knee, and hip joints  Follow up with your doctor as directed:  Write down your questions so you remember to ask them during your visits  © Copyright [employer] 2021 Information is for End User's use only and may not be sold, redistributed or otherwise used for commercial purposes  All illustrations and images included in CareNotes® are the copyrighted property of [employer]  or [employer]oy [employer]  The above information is an  only  It is not intended as medical advice for individual conditions or treatments  Talk to your doctor, nurse or pharmacist before following any medical regimen to see if it is safe and effective for you

## 2022-02-18 LAB
CCP AB SER IA-ACNC: 1.5
RHEUMATOID FACT SER QL LA: NEGATIVE

## 2022-02-23 RX ORDER — ERGOCALCIFEROL 1.25 MG/1
50000 CAPSULE ORAL WEEKLY
Qty: 12 CAPSULE | Refills: 1 | Status: SHIPPED | OUTPATIENT
Start: 2022-02-23

## 2022-04-07 DIAGNOSIS — F41.9 ANXIETY: ICD-10-CM

## 2022-04-07 RX ORDER — SERTRALINE HYDROCHLORIDE 100 MG/1
TABLET, FILM COATED ORAL
Qty: 90 TABLET | Refills: 0 | Status: SHIPPED | OUTPATIENT
Start: 2022-04-07 | End: 2022-06-30 | Stop reason: SDUPTHER

## 2022-04-22 DIAGNOSIS — R00.2 PALPITATION: ICD-10-CM

## 2022-04-22 RX ORDER — PROPRANOLOL HYDROCHLORIDE 40 MG/1
TABLET ORAL
Qty: 180 TABLET | Refills: 0 | Status: SHIPPED | OUTPATIENT
Start: 2022-04-22 | End: 2022-07-25

## 2022-05-17 ENCOUNTER — HOSPITAL ENCOUNTER (EMERGENCY)
Facility: HOSPITAL | Age: 62
Discharge: HOME | End: 2022-05-17
Attending: EMERGENCY MEDICINE
Payer: MEDICARE

## 2022-05-17 ENCOUNTER — APPOINTMENT (EMERGENCY)
Dept: RADIOLOGY | Facility: HOSPITAL | Age: 62
End: 2022-05-17
Payer: MEDICARE

## 2022-05-17 VITALS
RESPIRATION RATE: 20 BRPM | HEART RATE: 65 BPM | DIASTOLIC BLOOD PRESSURE: 87 MMHG | TEMPERATURE: 97.2 F | SYSTOLIC BLOOD PRESSURE: 174 MMHG | OXYGEN SATURATION: 99 %

## 2022-05-17 DIAGNOSIS — R20.2 PARESTHESIAS: ICD-10-CM

## 2022-05-17 DIAGNOSIS — R03.0 ELEVATED BLOOD PRESSURE READING: ICD-10-CM

## 2022-05-17 DIAGNOSIS — M25.512 ACUTE PAIN OF LEFT SHOULDER: Primary | ICD-10-CM

## 2022-05-17 PROCEDURE — 73060 X-RAY EXAM OF HUMERUS: CPT

## 2022-05-17 PROCEDURE — 99283 EMERGENCY DEPT VISIT LOW MDM: CPT

## 2022-05-17 PROCEDURE — 99284 EMERGENCY DEPT VISIT MOD MDM: CPT | Performed by: EMERGENCY MEDICINE

## 2022-05-17 PROCEDURE — 73030 X-RAY EXAM OF SHOULDER: CPT

## 2022-05-17 PROCEDURE — 73080 X-RAY EXAM OF ELBOW: CPT

## 2022-05-17 RX ORDER — LIDOCAINE 50 MG/G
1 PATCH TOPICAL ONCE
Status: DISCONTINUED | OUTPATIENT
Start: 2022-05-17 | End: 2022-05-17 | Stop reason: HOSPADM

## 2022-05-17 RX ORDER — ACETAMINOPHEN 325 MG/1
650 TABLET ORAL ONCE
Status: COMPLETED | OUTPATIENT
Start: 2022-05-17 | End: 2022-05-17

## 2022-05-17 RX ADMIN — LIDOCAINE 5% 1 PATCH: 700 PATCH TOPICAL at 11:06

## 2022-05-17 RX ADMIN — ACETAMINOPHEN 650 MG: 325 TABLET, FILM COATED ORAL at 11:05

## 2022-05-17 NOTE — ED PROVIDER NOTES
History  Chief Complaint   Patient presents with    Shoulder Injury     Pt c o Left shoulder pain  Pt states that last week she was playing tug of war with her dog  Patient is a 56-year-old right-handed female, with a history significant for osteoarthritis and peripheral artery disease managed by vascular surgery, who presents to the ED today with a one-week history of constant and progressively worsening left shoulder pain that began suddenly after playing tug-of-war with her dog  Patient reports associated paresthesias in the distal left upper extremity as well as discomfort down to her elbow on this limb  Patient denies pain or injury anywhere else  She feels as though she is in her usual state of health except for the above described symptoms  The pain is severe in intensity and sore in character  Patient has been taking ibuprofen to try and remit her symptoms without affect  Movement exacerbates her symptoms  Patient is without other concerns at this time     - No language barrier    - History obtained from patient  - There are no limitations to the history obtained  - Previous charting was reviewed          Prior to Admission Medications   Prescriptions Last Dose Informant Patient Reported?  Taking?   aspirin (ECOTRIN LOW STRENGTH) 81 mg EC tablet   Yes No   Sig: Take 81 mg by mouth   atorvastatin (LIPITOR) 40 mg tablet   Yes No   Sig: Take 40 mg by mouth daily   ergocalciferol (VITAMIN D2) 50,000 units   No No   Sig: Take 1 capsule (50,000 Units total) by mouth once a week   naproxen (NAPROSYN) 500 mg tablet   No No   Sig: Take 1 tablet (500 mg total) by mouth 2 (two) times a day with meals   propranolol (INDERAL) 40 mg tablet   No No   Sig: TAKE 1 TABLET BY MOUTH TWICE A DAY   sertraline (ZOLOFT) 100 mg tablet   No No   Sig: TAKE 1 TABLET BY MOUTH EVERY DAY      Facility-Administered Medications: None       Past Medical History:   Diagnosis Date    Chronic osteoarthritis 03/17/2012       Past Surgical History:   Procedure Laterality Date    LAPAROSCOPY         History reviewed  No pertinent family history  I have reviewed and agree with the history as documented  E-Cigarette/Vaping    E-Cigarette Use Never User      E-Cigarette/Vaping Substances    Nicotine No     THC No     CBD No     Flavoring No     Other No     Unknown No      Social History     Tobacco Use    Smoking status: Current Every Day Smoker     Packs/day: 0 25     Years: 35 00     Pack years: 8 75     Types: Cigarettes    Smokeless tobacco: Current User   Vaping Use    Vaping Use: Never used   Substance Use Topics    Alcohol use: Yes     Comment: socially    Drug use: Yes     Types: Marijuana        Review of Systems   Constitutional: Negative for fever  HENT: Negative for trouble swallowing  Eyes: Negative for visual disturbance  Respiratory: Negative for shortness of breath  Cardiovascular: Negative for chest pain  Gastrointestinal: Negative for abdominal pain  Endocrine: Negative for polyuria  Genitourinary: Negative for dysuria  Musculoskeletal: Positive for arthralgias and myalgias  Negative for gait problem  Skin: Negative for rash  Allergic/Immunologic: Negative for environmental allergies  Neurological: Positive for numbness  Negative for weakness  Hematological: Negative for adenopathy  Psychiatric/Behavioral: Negative for confusion  All other systems reviewed and are negative        Physical Exam  ED Triage Vitals   Temperature Pulse Respirations Blood Pressure SpO2   05/17/22 1015 05/17/22 1015 05/17/22 1015 05/17/22 1015 05/17/22 1015   (!) 97 2 °F (36 2 °C) 65 20 (!) 174/87 99 %      Temp src Heart Rate Source Patient Position - Orthostatic VS BP Location FiO2 (%)   -- 05/17/22 1015 -- -- --    Monitor         Pain Score       05/17/22 1105       9             Orthostatic Vital Signs  Vitals:    05/17/22 1015   BP: (!) 174/87   Pulse: 65       Physical Exam  Vitals and nursing note reviewed  Constitutional:       General: She is not in acute distress  Appearance: She is not toxic-appearing  Comments: Patient appears comfortable during my evaluation    HENT:      Head: Normocephalic and atraumatic  Right Ear: External ear normal       Left Ear: External ear normal       Nose: Nose normal  No rhinorrhea  Mouth/Throat:      Mouth: Mucous membranes are moist       Pharynx: Oropharynx is clear  No oropharyngeal exudate or posterior oropharyngeal erythema  Eyes:      General: No scleral icterus  Right eye: No discharge  Left eye: No discharge  Conjunctiva/sclera: Conjunctivae normal       Pupils: Pupils are equal, round, and reactive to light  Neck:      Comments: Patient is spontaneously rotating their neck to the left and right during the history and physical exam interaction without difficulty or apparent discomfort    Cardiovascular:      Rate and Rhythm: Normal rate and regular rhythm  Pulses: Normal pulses  Heart sounds: Normal heart sounds  No murmur heard  No friction rub  No gallop  Comments: 2+ Radial bilaterally  Pulmonary:      Effort: Pulmonary effort is normal  No respiratory distress  Breath sounds: Normal breath sounds  No stridor  No wheezing, rhonchi or rales  Abdominal:      General: Abdomen is flat  There is no distension  Palpations: Abdomen is soft  Tenderness: There is no abdominal tenderness  There is no right CVA tenderness, left CVA tenderness, guarding or rebound  Musculoskeletal:         General: Tenderness (Left shoulder, proximal arm, elbow, trapezius ) present  No swelling or deformity  Cervical back: Neck supple  No tenderness  No muscular tenderness  Comments: Patient had difficulty maneuvering her arm to test the subscapularis    Intact supraspinatus, teres minor, infraspinatus strength with testing; however, there was pain in the shoulder when testing the rotator cuff against resistance  Negative drop-arm testing    No anatomic snuffbox tenderness to palpation    Lymphadenopathy:      Cervical: No cervical adenopathy  Skin:     General: Skin is warm and dry  Capillary Refill: Capillary refill takes less than 2 seconds  Neurological:      Mental Status: She is alert  Comments: Patient is speaking clearly in complete sentences  Patient is answering appropriately and able follow commands  Patient is moving all four extremities spontaneously  No facial droop  Tongue midline  Intact median, radial, ulnar, axillary motor and sensory function bilaterally   Psychiatric:         Mood and Affect: Mood normal          Behavior: Behavior normal          ED Medications  Medications   acetaminophen (TYLENOL) tablet 650 mg (650 mg Oral Given 5/17/22 1105)       Diagnostic Studies  Results Reviewed     None                 XR shoulder 2+ views LEFT   ED Interpretation by Salas Hunter MD (05/17 2888)   No acute osseous abnormality      Final Result by Chaka Santana MD (05/17 1314)      No acute osseous abnormality  Workstation performed: [id number]         XR humerus LEFT   ED Interpretation by Salas Hunter MD (05/17 0979)   No acute osseous abnormality      Final Result by Chaka Santana MD (05/17 4545)      No acute osseous abnormality  Workstation performed: [id number]         XR elbow 3+ vw LEFT   ED Interpretation by Salas Hunter MD (05/17 1319)   No acute osseous abnormality      Final Result by Chaka Santana MD (05/17 3761)      No acute osseous abnormality  Workstation performed: [id number]               Procedures  Procedures      ED Course  ED Course as of 05/17/22 1726   Tue May 17, 2022   1250 Patient reports improvement in her symptoms   1251 Patient has neither questions nor concerns after receiving discharge instructions and return precautions    She was advised to perform range-of-motion exercises with her left shoulder, every few hours, to prevent frozen shoulder if she chooses to wear the sling                             SBIRT 20yo+    Flowsheet Row Most Recent Value   SBIRT (25 yo +)    In order to provide better care to our patients, we are screening all of our patients for alcohol and drug use  Would it be okay to ask you these screening questions? Unable to answer at this time Filed at: 05/17/2022 1017                MDM  Number of Diagnoses or Management Options  Acute pain of left shoulder  Elevated blood pressure reading  Paresthesias  Diagnosis management comments: Patient is a 58-year-old right-handed female, with a history significant for osteoarthritis and peripheral artery disease managed by vascular surgery, who presents to the ED today with a one-week history of constant and progressively worsening left shoulder pain that began suddenly after playing tug-of-war with her dog  Patient reports associated paresthesias in the distal left upper extremity as well as discomfort down to her elbow on this limb  Patient denies pain or injury anywhere else  She feels as though she is in her usual state of health except for the above described symptoms  The pain is severe in intensity and sore in character  Patient is currently afebrile and hemodynamically stable  Her physical exam is notable for limited range of motion as well as pain with provocative testing of the rotator cuff  Median, radial, ulnar, axillary motor and sensory function are intact  This presentation is concerning for:  Partial rotator cuff tear  I also considered fracture, dislocation  Will investigate with x-rays of the left shoulder, humerus, elbow  Will manage with Tylenol and Lidoderm patch and further based on workup        Disposition  Final diagnoses:   Acute pain of left shoulder   Paresthesias   Elevated blood pressure reading     Time reflects when diagnosis was documented in both MDM as applicable and the Disposition within this note     Time User Action Codes Description Comment    5/17/2022  1:11 PM Jose Flow A Add [G86 144] Acute pain of left shoulder     5/17/2022  1:11 PM Jose Flow A Add [R20 2] Paresthesias     5/17/2022  1:11 PM Tiburcio Hightower A Add [R03 0] Elevated blood pressure reading       ED Disposition     ED Disposition   Discharge    Condition   Stable    Date/Time   Tue May 17, 2022  1:13 PM    Comment   Alvarado Weiss discharge to home/self care                 Follow-up Information     Follow up With Specialties Details Why Contact Info Additional SUSANA Le Nurse Practitioner Schedule an appointment as soon as possible for a visit   [address]       30 [facility] Orthopedic Surgery Schedule an appointment as soon as possible for a visit   Kari [address] [facility], [address] [facility]  Use Entrance A           Discharge Medication List as of 5/17/2022  1:13 PM      CONTINUE these medications which have NOT CHANGED    Details   aspirin (ECOTRIN LOW STRENGTH) 81 mg EC tablet Take 81 mg by mouth, Historical Med      atorvastatin (LIPITOR) 40 mg tablet Take 40 mg by mouth daily, Starting Mon 10/18/2021, Until Tue 10/18/2022, Historical Med      ergocalciferol (VITAMIN D2) 50,000 units Take 1 capsule (50,000 Units total) by mouth once a week, Starting Wed 2/23/2022, Normal      naproxen (NAPROSYN) 500 mg tablet Take 1 tablet (500 mg total) by mouth 2 (two) times a day with meals, Starting Thu 2/17/2022, Normal      propranolol (INDERAL) 40 mg tablet TAKE 1 TABLET BY MOUTH TWICE A DAY, Normal      sertraline (ZOLOFT) 100 mg tablet TAKE 1 TABLET BY MOUTH EVERY DAY, Normal               PDMP Review     None           ED Provider  Attending physically available and evaluated Collette Vaughn I managed the patient along with the ED Attending      Electronically Signed by         Stefanie Brito MD  05/17/22 7720

## 2022-05-17 NOTE — DISCHARGE INSTRUCTIONS
You were evaluated in the emergency department for: left shoulder pain  You can access your current and pending results through [facility]  A radiologist will take a second look at your X-Rays, if you had any, and you will be contacted with any new findings  You should follow-up with your primary care provider, as soon as possible, for re-evaluation  If you do not have a primary care provider, I have referred you to [address] [place] [address]  You will be contacted about scheduling an appointment  Their phone number is also included on this paperwork  You have also been referred to orthopaedic surgery and you should follow up with them  You may take 650mg of tylenol every four to six hours, not exceeding 3,000mg daily, for the management of your discomfort  You may also take ibuprofen, 400mg every six to eight hours  Your workup revealed no emergent features at this time; however, many disease processes are dynamic:    Please, return to the emergency department if you experience new or worsening symptoms, fever, chest pain, shortness of breath, difficulty breathing, dizziness, abdominal pain, persistent nausea/vomiting, syncope or passing out, blood in your urine or stool, coughing up blood, leg swelling/pain, urinary retention, bowel or bladder incontinence, numbness between your legs  Additionally, your blood pressure was measured to be high  This is something that you should discuss with your primary care provider and have re-checked within one week

## 2022-05-17 NOTE — ED NOTES
Pt pacing trena, upset at time it takes to have xray read  Dr Denise Mandujano aware and responded at [phone]   Updated pt that provider will wet read xrays and be over     Radha Cook RN  05/17/22 9079

## 2022-05-17 NOTE — ED ATTENDING ATTESTATION
5/17/2022  I, Ricardo Holder MD, saw and evaluated the patient  I have discussed the patient with the resident/non-physician practitioner and agree with the resident's/non-physician practitioner's findings, Plan of Care, and MDM as documented in the resident's/non-physician practitioner's note, except where noted  All available labs and Radiology studies were reviewed  I was present for key portions of any procedure(s) performed by the resident/non-physician practitioner and I was immediately available to provide assistance  At this point I agree with the current assessment done in the Emergency Department  I have conducted an independent evaluation of this patient a history and physical is as follows:    Patient presents emergency department with a one-week history of left shoulder pain  Patient reports that the pain began when she was playing tug-of-war with her dog  While in the midst of playing tug-of-war she felt sudden pain in her left shoulder which she thought was a pulled muscle and that it would eventually resolve  The pain persists so she came to the emergency department  The patient reports she has pain over the lateral aspect of the shoulder and over the trapezius muscle  On review of systems the patient admits to occasionally tingling in the upper arm in the entire lower arm  The patient denies any paresthesias at this time  The patient denies any weakness in the arm or hand  The patient denies other injury or complaint  Physical exam demonstrates a female no acute distress  Left upper extremity was tender over the deltoid muscle and trapezius muscle  The remainder of the arm was nontender  The entire left upper extremity had normal strength and sensation testing  The skin appeared to be normal   There is no bruising, fluctuance, or induration  The right upper extremity was normal   Both upper extremities are neurovascularly intact    ED Course         Critical Care Time  Procedures

## 2022-05-17 NOTE — ED NOTES
Pt given sling, currently refusing to put sling on but will take home to use, provider at bedside speaking with pt     Anjali Rodríguez RN  05/17/22 3283

## 2022-05-23 ENCOUNTER — OFFICE VISIT (OUTPATIENT)
Dept: OBGYN CLINIC | Facility: HOSPITAL | Age: 62
End: 2022-05-23
Attending: EMERGENCY MEDICINE
Payer: MEDICARE

## 2022-05-23 VITALS
SYSTOLIC BLOOD PRESSURE: 151 MMHG | DIASTOLIC BLOOD PRESSURE: 82 MMHG | HEART RATE: 60 BPM | HEIGHT: 67 IN | BODY MASS INDEX: 20.56 KG/M2 | WEIGHT: 131 LBS

## 2022-05-23 DIAGNOSIS — R20.2 PARESTHESIAS: ICD-10-CM

## 2022-05-23 DIAGNOSIS — S46.012D TRAUMATIC INCOMPLETE TEAR OF LEFT ROTATOR CUFF, SUBSEQUENT ENCOUNTER: Primary | ICD-10-CM

## 2022-05-23 DIAGNOSIS — M25.512 ACUTE PAIN OF LEFT SHOULDER: ICD-10-CM

## 2022-05-23 PROBLEM — M75.102 UNSPECIFIED ROTATOR CUFF TEAR OR RUPTURE OF LEFT SHOULDER, NOT SPECIFIED AS TRAUMATIC: Status: ACTIVE | Noted: 2022-05-23

## 2022-05-23 PROCEDURE — 99202 OFFICE O/P NEW SF 15 MIN: CPT | Performed by: ORTHOPAEDIC SURGERY

## 2022-05-23 NOTE — PROGRESS NOTES
Assessment/Plan:    Unspecified rotator cuff tear or rupture of left shoulder, not specified as traumatic  We are going to get her into physical therapy for Gillespie of range of motion and strengthening  I think that she clearly has a supraspinatus tear so we are going to order an MRI of her shoulder  Diagnoses and all orders for this visit:    Traumatic incomplete tear of left rotator cuff, subsequent encounter  -     Ambulatory Referral to Physical Therapy; Future  -     MRI shoulder left wo contrast; Future    Acute pain of left shoulder  -     Ambulatory Referral to Orthopedic Surgery    Paresthesias  -     Ambulatory Referral to Orthopedic Surgery          Subjective:      Patient ID: Daniel Mishra is a 57 y o  female  This is a 79-year-old woman who has this on of left shoulder pain on 10 May 2002  She was playing tug-of-war with her home dog when she snatched her shoulder really hard she felt immediate onset of sharp pain with gripping  Since that point her shoulder has been dysfunctional   She has had a lot of pain  She now follows up  The following portions of the patient's history were reviewed and updated as appropriate: allergies, current medications, past family history, past medical history, past social history, past surgical history, and problem list     Review of Systems      Objective:      /82   Pulse 60   Ht 5' 7" (1 702 m)   Wt 59 4 kg (131 lb)   BMI 20 52 kg/m²          Physical Exam      On physical examination she has definite weakness of her supraspinatus  Her infraspinatus and subscapularis are strong  She can actively abduct to about 100° before having severe pain  Shoulders clearly reduced  Her alignment looks good  There is no sign of a problem  Review of x-rays from the emergency room show no sign of any trauma

## 2022-05-23 NOTE — ASSESSMENT & PLAN NOTE
We are going to get her into physical therapy for Gillespie of range of motion and strengthening  I think that she clearly has a supraspinatus tear so we are going to order an MRI of her shoulder

## 2022-05-31 ENCOUNTER — EVALUATION (OUTPATIENT)
Dept: PHYSICAL THERAPY | Facility: CLINIC | Age: 62
End: 2022-05-31
Payer: MEDICARE

## 2022-05-31 DIAGNOSIS — S46.012D TRAUMATIC INCOMPLETE TEAR OF LEFT ROTATOR CUFF, SUBSEQUENT ENCOUNTER: ICD-10-CM

## 2022-05-31 DIAGNOSIS — M54.12 LEFT CERVICAL RADICULOPATHY: Primary | ICD-10-CM

## 2022-05-31 PROCEDURE — 97140 MANUAL THERAPY 1/> REGIONS: CPT

## 2022-05-31 PROCEDURE — 97162 PT EVAL MOD COMPLEX 30 MIN: CPT

## 2022-05-31 NOTE — PROGRESS NOTES
PT Evaluation     Today's date: 2022  Patient name: João Palomino  : 1960  MRN: [id number]  Referring provider: Lisy Conley MD  Dx:   Encounter Diagnosis     ICD-10-CM    1  Left cervical radiculopathy  M54 12    2  Traumatic incomplete tear of left rotator cuff, subsequent encounter  S46 012D Ambulatory Referral to Physical Therapy       Start Time: 1432  Stop Time: 1520  Total time in clinic (min): 48 minutes    Assessment  Assessment details: Pt is a 57 yof presenting to therapy with L shoulder pain following traumatic incident during tug of war with her dog about a month ago, signs and symptoms consistent with L cervical radiculopathy and potential rotator cuff tear  Pt displays hypomobility of cervical spine in multiple planes with pain, as well as AROM deficits of L shoulder in multiple planes with pain  Pt also displays neural tension in LUE that reproduces her chief complaint  Due to pt's weakness and pain, she is limited in using her LUE for reaching, lifting, cooking, bathing, and dressing  Pt will benefit from continued physical therapy twice a week for 6-8 weeks to improve strength and decrease pain  Impairments: abnormal or restricted ROM, activity intolerance, impaired physical strength, lacks appropriate home exercise program and pain with function    Symptom irritability: moderateUnderstanding of Dx/Px/POC: good   Prognosis: good    Goals  Short term goals (3-4 weeks)  1  Pt will display independence with understanding and performance of HEP to allow for carryover of plan of care at home  2  Pt will improve FOTO score from initial evaluation to show improvement in pain and function  3  Pt will increase L cervical rotation ROM by 10 degrees to assist with driving  4  Pt will increase L shoulder flexion strength to 3+/5 to improve reaching and lifting  5  Pt will increase L shoulder abduction strength to 4/5 to improve reaching and lifting     6  Pt will increase shoulder flexion and abduction AROM by 10 degrees to improve reaching and lifting  Long term goals (6-8 weeks)  1  Pt will score equal or better than projected score on FOTO to show improvement in pain and function  2  Pt will increase L cervical rotation ROM by 20 degrees to assist with driving  3  Pt will increase L shoulder flexion strength to 4/5 to improve reaching and lifting  4  Pt will increase L shoulder abduction strength to 4+/5 to improve reaching and lifting  5  Pt will increase shoulder flexion and abduction AROM by 20 degrees to improve reaching and lifting  Plan  Patient would benefit from: skilled physical therapy  Planned modality interventions: TENS, thermotherapy: hydrocollator packs, traction, ultrasound, cryotherapy and low level laser therapy  Planned therapy interventions: body mechanics training, therapeutic training, therapeutic exercise, therapeutic activities, stretching, strengthening, neuromuscular re-education, patient education, home exercise program, functional ROM exercises, flexibility, manual therapy, [place] taping, joint mobilization and balance  Frequency: 2x week  Duration in weeks: 8  Treatment plan discussed with: patient        Subjective Evaluation    History of Present Illness  Mechanism of injury: Pt presents to therapy with L shoulder pain after traumatic incident while playing tug of war with her dog about a month or so ago  Pt reports pain with reaching and lifting  She is wearing sling throughout the day as needed  Pt reports the pain starts around her upper trap and radiates down into her elbow and around her scapula    Pain  Current pain ratin  At best pain ratin  At worst pain rating: 10  Quality: burning and throbbing  Relieving factors: medications  Aggravating factors: lifting and overhead activity (any time she uses her L arm)  Progression: no change    Hand dominance: right    Patient Goals  Patient goals for therapy: decreased pain and increased strength (return to using LUE )          Objective     General Comments:      Shoulder Comments   Cervical ROM  Flexion: WNL no pain  Extension: reproduction of shoulder pain with limitation  SB: reproduction of shoulder pain  Rot: 71 R, 49 L tightness at end range    UE ROM  Shoulder flex:  WNL R, 129 L  Shoulder Abd: 120 L, WNL R  Functional ER: T4 L, T5 R  Functional IR: T7 R, T10 L    UE strength  UT Shru/5 bilat  Shoulder flex: 4+/5 R, 3/5 L with pain  Shoulder abd: 4/5 R, 3+/5 L  ER: 4+/5 L, 5/5 R  IR: 4/5 L, 5/5 R  Elbow flex5/5 bilat  Elbow ext: 4+/5 R, 4/5 L  Gripping: weaker L     Special tests  Aldridge stalin: (+) R, (-) L: reproduction of posterior shoulder pain  Infraspinatus: (-) L, (-) R  Painful arc: (+) L  Drop arm: (+) L  Compression: (-)  Distraction: (-)  ULTT: (+) median, ulnar, radial LUE  Spurlings: (+) L, (-) R    Palpation  Lateral glides: hypomobility C4-C7 L to R, T1 PA               Precautions: PVD, rotator cuff rupture L shoulder, anxiety, heart palpitations, herniated IV disc, chronic OA      Manuals             Cervical lateral glides L to R C4-C7, T1 PA; nv            Manual LUE nerve glides (all) nv                                      Neuro Re-Ed             Scapular retraction 1x10; HEP            No monies 1x10 ptb; HEP            Shoulder abduction iso 1x10; HEP            Rows/ext nv            Chin tucks nv            Resisted ER/IR nv                         Ther Ex             SNAGS HEP            pulleys nv            Thoracic ext nv            Finger ladders nv            Supine shoulder flexion nv            sidelying abduction nv                                      Ther Activity             Wall slides nv                         Gait Training                                       Modalities

## 2022-06-07 ENCOUNTER — OFFICE VISIT (OUTPATIENT)
Dept: PHYSICAL THERAPY | Facility: CLINIC | Age: 62
End: 2022-06-07
Payer: COMMERCIAL

## 2022-06-07 DIAGNOSIS — S46.012D TRAUMATIC INCOMPLETE TEAR OF LEFT ROTATOR CUFF, SUBSEQUENT ENCOUNTER: Primary | ICD-10-CM

## 2022-06-07 DIAGNOSIS — M19.90 ARTHRITIS: ICD-10-CM

## 2022-06-07 DIAGNOSIS — M19.90 CHRONIC OSTEOARTHRITIS: ICD-10-CM

## 2022-06-07 DIAGNOSIS — M54.12 LEFT CERVICAL RADICULOPATHY: ICD-10-CM

## 2022-06-07 PROCEDURE — 97140 MANUAL THERAPY 1/> REGIONS: CPT

## 2022-06-07 PROCEDURE — 97112 NEUROMUSCULAR REEDUCATION: CPT

## 2022-06-07 NOTE — PROGRESS NOTES
Daily Note     Today's date: 2022  Patient name: Judah Hill  : 1960  MRN: [id number]  Referring provider: Casey Hamilton MD  Dx:   Encounter Diagnosis     ICD-10-CM    1  Traumatic incomplete tear of left rotator cuff, subsequent encounter  S46 012D    2  Left cervical radiculopathy  M54 12        Start Time: 931  Stop Time: 1016  Total time in clinic (min): 45 minutes    Subjective: Pt reports she's doing ok today but she feels like she pinched her sciatic nerve recently  Objective: See treatment diary below      Assessment: Tolerated treatment well  Patient tolerated manual therapy with some discomfort; hypomobility was noted with cervical lateral glides L to R  Tactile cuing was required for rows and extensions, which she reported made her shoulder fatigued and caused some discomfort  Verbal cuing was provided for form during resisted ER  Continue to progress pt as tolerated next visit  Plan: Continue per plan of care        Precautions: PVD, rotator cuff rupture L shoulder, anxiety, heart palpitations, herniated IV disc, chronic OA      Manuals            Cervical lateral glides L to R C4-C7, T1 PA; nv L to R C3-C7, T1 PA           Manual LUE nerve glides (all) nv 1x20 each                                     Neuro Re-Ed            Scapular retraction 1x10; HEP            No monies 1x10 ptb; HEP            Shoulder abduction iso 1x10; HEP            Rows/ext nv 10# 2x15 each           Chin tucks nv 5" hold 1x15           Resisted ER/IR nv rtb 2x10 each side                        Ther Ex            SNAGS HEP            pulleys nv 5'           Thoracic ext nv Over foam roll 2x10           Finger ladders nv            Supine shoulder flexion nv 4# weight bar 1x10           sidelying abduction nv 1x10 2#                                     Ther Activity            Wall slides nv                         Gait Training  Modalities 5/31 6/7

## 2022-06-09 ENCOUNTER — OFFICE VISIT (OUTPATIENT)
Dept: PHYSICAL THERAPY | Facility: CLINIC | Age: 62
End: 2022-06-09
Payer: COMMERCIAL

## 2022-06-09 DIAGNOSIS — S46.012D TRAUMATIC INCOMPLETE TEAR OF LEFT ROTATOR CUFF, SUBSEQUENT ENCOUNTER: Primary | ICD-10-CM

## 2022-06-09 DIAGNOSIS — M54.12 LEFT CERVICAL RADICULOPATHY: ICD-10-CM

## 2022-06-09 PROCEDURE — 97112 NEUROMUSCULAR REEDUCATION: CPT

## 2022-06-09 PROCEDURE — 97110 THERAPEUTIC EXERCISES: CPT

## 2022-06-09 NOTE — PROGRESS NOTES
Daily Note     Today's date: 2022  Patient name: Marquez Rhodes  : 1960  MRN: [id number]  Referring provider: Felipa Deleon MD  Dx:   Encounter Diagnosis     ICD-10-CM    1  Traumatic incomplete tear of left rotator cuff, subsequent encounter  S46 012D    2  Left cervical radiculopathy  M54 12        Start Time: 0935  Stop Time: 1010  Total time in clinic (min): 35 minutes    Subjective: Pt reports she was challenged and adequately sore after last session  Her sciatic nerve continues to act up  Objective: See treatment diary below      Assessment: Tolerated treatment well  Patient reported feeling a "workout soreness" following the session  She required initial cuing for wall slides and middle trap pull aparts with good form following cuing  Some end range deficit during sidelying abduction with weight  Continue to progress pt as tolerated next visit  Plan: Continue per plan of care        Precautions: PVD, rotator cuff rupture L shoulder, anxiety, heart palpitations, herniated IV disc, chronic OA      Manuals           Cervical lateral glides L to R C4-C7, T1 PA; nv L to R C3-C7, T1 PA           Manual LUE nerve glides (all) nv 1x20 each                                     Neuro Re-Ed           Scapular retraction 1x10; HEP            No monies 1x10 ptb; HEP            Shoulder abduction iso 1x10; HEP            Rows/ext nv 10# 2x15 each 11# 1x15 each          Chin tucks nv 5" hold 1x15           Resisted ER/IR nv rtb 2x10 each side gtb 1x15 each side          Middle trap pull aparts   **          Ther Ex           SNAGS HEP            pulleys nv 5' 5'          Thoracic ext nv Over foam roll 2x10           Finger ladders nv            Supine shoulder flexion nv 4# weight bar 1x10           sidelying abduction nv 1x10 2# 2x10 2#                                    Ther Activity           Wall slides nv  2x10          Pt edu   NS Gait Training 5/31 6/7 6/9                                    Modalities 5/31 6/7 6/9

## 2022-06-13 ENCOUNTER — OFFICE VISIT (OUTPATIENT)
Dept: PHYSICAL THERAPY | Facility: CLINIC | Age: 62
End: 2022-06-13
Payer: COMMERCIAL

## 2022-06-13 DIAGNOSIS — M54.12 LEFT CERVICAL RADICULOPATHY: ICD-10-CM

## 2022-06-13 DIAGNOSIS — S46.012D TRAUMATIC INCOMPLETE TEAR OF LEFT ROTATOR CUFF, SUBSEQUENT ENCOUNTER: Primary | ICD-10-CM

## 2022-06-13 PROCEDURE — 97112 NEUROMUSCULAR REEDUCATION: CPT

## 2022-06-13 PROCEDURE — 97110 THERAPEUTIC EXERCISES: CPT

## 2022-06-13 NOTE — PROGRESS NOTES
Daily Note     Today's date: 2022  Patient name: Faith Larson  : 1960  MRN: [id number]  Referring provider: Shonda Mc MD  Dx:   Encounter Diagnosis     ICD-10-CM    1  Traumatic incomplete tear of left rotator cuff, subsequent encounter  S46 012D    2  Left cervical radiculopathy  M54 12        Start Time: 0175  Stop Time: 1011  Total time in clinic (min): 40 minutes    Subjective: Pt reports she is going for an angioplasty for her LE pain  She reports her shoulder and neck are feeling good today in comparison  Pt reported no shoulder pain at the end of the session, just fatigue  Objective: See treatment diary below      Assessment: Tolerated treatment well  Patient progressed resisted ER and rows/ext with increased weight with no adverse symptoms  Pt required verbal cuing for form during rows  Prone I, T, Y was performed with initial cuing required; pt displayed weakness with prone Y's with decreased height and muscle fasciculations  Weakness was noted with wall slides with added abduction band; pt displayed elbow winging and decreased tension on the band at the top of the movement  Continue to progress pt as tolerated next visit  Plan: Continue per plan of care        Precautions: PVD, rotator cuff rupture L shoulder, anxiety, heart palpitations, herniated IV disc, chronic OA      Manuals          Cervical lateral glides L to R C4-C7, T1 PA; nv L to R C3-C7, T1 PA           Manual LUE nerve glides (all) nv 1x20 each                                     Neuro Re-Ed          Scapular retraction 1x10; HEP            No monies 1x10 ptb; HEP            Shoulder abduction iso 1x10; HEP            Rows/ext nv 10# 2x15 each 11# 1x15 each 13# 1x15 each         Chin tucks nv 5" hold 1x15           Resisted ER/IR nv rtb 2x10 each side gtb 1x15 each side btb 1x15 each         SA supine press    3# 1x15         Prone I, T, Y    1x10 each         Middle trap pull aparts    nv         Ther Ex 5/31 6/7 6/9 6/13         SNAGS HEP            pulleys nv 5' 5' 5'         Thoracic ext nv Over foam roll 2x10           Finger ladders nv            Supine shoulder flexion nv 4# weight bar 1x10  4# weight bar 2x10         sidelying abduction nv 1x10 2# 2x10 2# 3# 2x10                                   Ther Activity 5/31 6/7 6/9 6/13         Wall slides nv  2x10 2x7 ptb abduction         Pt edu   NS NS         Gait Training 5/31 6/7 6/9 6/13                                   Modalities 5/31 6/7 6/9 6/13

## 2022-06-16 ENCOUNTER — APPOINTMENT (OUTPATIENT)
Dept: PHYSICAL THERAPY | Facility: CLINIC | Age: 62
End: 2022-06-16
Payer: COMMERCIAL

## 2022-06-21 ENCOUNTER — OFFICE VISIT (OUTPATIENT)
Dept: PHYSICAL THERAPY | Facility: CLINIC | Age: 62
End: 2022-06-21
Payer: COMMERCIAL

## 2022-06-21 DIAGNOSIS — S46.012D TRAUMATIC INCOMPLETE TEAR OF LEFT ROTATOR CUFF, SUBSEQUENT ENCOUNTER: Primary | ICD-10-CM

## 2022-06-21 DIAGNOSIS — M54.12 LEFT CERVICAL RADICULOPATHY: ICD-10-CM

## 2022-06-21 PROCEDURE — 97140 MANUAL THERAPY 1/> REGIONS: CPT

## 2022-06-21 PROCEDURE — 97112 NEUROMUSCULAR REEDUCATION: CPT

## 2022-06-21 NOTE — PROGRESS NOTES
Daily Note     Today's date: 2022  Patient name: Nash Engel  : 1960  MRN: [id number]  Referring provider: Violet Arredondo MD  Dx:   Encounter Diagnosis     ICD-10-CM    1  Traumatic incomplete tear of left rotator cuff, subsequent encounter  S46 012D    2  Left cervical radiculopathy  M54 12        Start Time: 933  Stop Time: 1020  Total time in clinic (min): 47 minutes    Subjective: Pt reports she is in more pain leaving therapy than when she arrives  She is going for an MRI in two weeks for her L shoulder  Today, after regressing exercises and treating the neck more, pt reported decreased pain following the session  Objective: See treatment diary below      Assessment: Tolerated treatment well  Due to pt report of increased pain following therapy, exercises were regressed  Following manuals this session, pt reported no pain around the L shoulder, but some pain around the L shoulder blade area  Pt performed chin tucks with extensions following cuing; after the exercise, pt reported her shoulder blade pain resolved with increased pain in the neck  Pt was educated on centralization versus peripheralization of neural symptoms  She was also provided with chin tucks with extension to add to her HEP and educated on performing it as much as she can throughout the day  Continue to progress pt as tolerated next visit  Plan: Continue per plan of care        Precautions: PVD, rotator cuff rupture L shoulder, anxiety, heart palpitations, herniated IV disc, chronic OA      Manuals         Cervical lateral glides L to R C4-C7, T1 PA; nv L to R C3-C7, T1 PA   L to R C3-C7, T1 PA        Manual LUE nerve glides (all) nv 1x20 each                                     Neuro Re-Ed         Scapular retraction 1x10; HEP            No monies 1x10 ptb; HEP    1x15 gtb        Shoulder abduction iso 1x10; HEP    2x10 5" hold        Rows/ext nv 10# 2x15 each 11# 1x15 each 13# 1x15 each 10# 2x15        Chin tucks nv 5" hold 1x15   1x10 5" hold         Resisted ER/IR nv rtb 2x10 each side gtb 1x15 each side btb 1x15 each btb 1x15 ER        SA supine press    3# 1x15         Prone I, T, Y    1x10 each         ER/IR iso     1x10 each 5" hold        Chin tuck with extension     1x10        Middle trap pull aparts    nv         Ther Ex 5/31 6/7 6/9 6/13 6/21        SNAGS HEP            pulleys nv 5' 5' 5' 5'        Thoracic ext nv Over foam roll 2x10           Finger ladders nv            Supine shoulder flexion nv 4# weight bar 1x10  4# weight bar 2x10         sidelying abduction nv 1x10 2# 2x10 2# 3# 2x10                                   Ther Activity 5/31 6/7 6/9 6/13 6/21        Wall slides nv  2x10 2x7 ptb abduction 2x10 with towel        Pt edu   NS NS NS        Gait Training 5/31 6/7 6/9 6/13 6/21                                  Modalities 5/31 6/7 6/9 6/13 6/21

## 2022-06-23 ENCOUNTER — OFFICE VISIT (OUTPATIENT)
Dept: PHYSICAL THERAPY | Facility: CLINIC | Age: 62
End: 2022-06-23
Payer: COMMERCIAL

## 2022-06-23 DIAGNOSIS — M19.90 CHRONIC OSTEOARTHRITIS: ICD-10-CM

## 2022-06-23 DIAGNOSIS — S46.012D TRAUMATIC INCOMPLETE TEAR OF LEFT ROTATOR CUFF, SUBSEQUENT ENCOUNTER: Primary | ICD-10-CM

## 2022-06-23 DIAGNOSIS — M19.90 ARTHRITIS: ICD-10-CM

## 2022-06-23 DIAGNOSIS — M54.12 LEFT CERVICAL RADICULOPATHY: ICD-10-CM

## 2022-06-23 PROCEDURE — 97140 MANUAL THERAPY 1/> REGIONS: CPT

## 2022-06-23 PROCEDURE — 97530 THERAPEUTIC ACTIVITIES: CPT

## 2022-06-23 PROCEDURE — 97112 NEUROMUSCULAR REEDUCATION: CPT

## 2022-06-23 NOTE — PROGRESS NOTES
Daily Note     Today's date: 2022  Patient name: Jacob Velasquez  : 1960  MRN: [id number]  Referring provider: Lenora Marks MD  Dx:   Encounter Diagnosis     ICD-10-CM    1  Traumatic incomplete tear of left rotator cuff, subsequent encounter  S46 012D    2  Left cervical radiculopathy  M54 12    3  Chronic osteoarthritis  M19 90    4  Arthritis  M19 90        Start Time:   Stop Time: 101  Total time in clinic (min): 38 minutes    Subjective: Pt reports her neck and shoulder pain is slightly better compared to Tuesday  Following manual therapy, her pain was located in the L side of her neck and into her L scapula posteriorly with no pain into her L arm  Objective: See treatment diary below      Assessment: Tolerated treatment well  Patient required cuing for abduction and ER/IR isometrics for form  Central gliders were trialed this visit along with repeated cervical retraction with extension; pt experienced symptoms in the L side of her neck and posterior L scapula following manuals and exercises  Trial radial/ulnar biases next visit for central gliders and PT OP for cervical retraction with extension  Continue to progress pt as tolerated, monitoring centralization of symptoms  Plan: Continue per plan of care        Precautions: PVD, rotator cuff rupture L shoulder, anxiety, heart palpitations, herniated IV disc, chronic OA      Manuals        Cervical lateral glides L to R C4-C7, T1 PA; nv L to R C3-C7, T1 PA   L to R C3-C7, T1 PA        Manual LUE nerve glides (all) nv 1x20 each    median nerve bias central gliders; trial radial/ulnar nv                                 Neuro Re-Ed        Scapular retraction 1x10; HEP            No monies 1x10 ptb; HEP    1x15 gtb 1x15 gtb       Shoulder abduction iso 1x10; HEP    2x10 5" hold 1x10 5" hold       Rows/ext nv 10# 2x15 each 11# 1x15 each 13# 1x15 each 10# 2x15        Chin tucks nv 5" hold 1x15   1x10 5" hold  1x15 5" hold       Resisted ER/IR nv rtb 2x10 each side gtb 1x15 each side btb 1x15 each btb 1x15 ER        SA supine press    3# 1x15         Prone I, T, Y    1x10 each         ER/IR iso     1x10 each 5" hold 1x10 each 5" hold       Chin tuck with extension     1x10 1x10; PT OP nv       Middle trap pull aparts    nv         Ther Ex 5/31 6/7 6/9 6/13 6/21 6/23       SNAGS HEP            pulleys nv 5' 5' 5' 5'        Thoracic ext nv Over foam roll 2x10           Finger ladders nv            Supine shoulder flexion nv 4# weight bar 1x10  4# weight bar 2x10         sidelying abduction nv 1x10 2# 2x10 2# 3# 2x10         UBE      4' fwd                    Ther Activity 5/31 6/7 6/9 6/13 6/21 6/23       Wall slides nv  2x10 2x7 ptb abduction 2x10 with towel 2x10 with towel       Pt edu   NS NS NS NS       Gait Training 5/31 6/7 6/9 6/13 6/21 6/23                                 Modalities 5/31 6/7 6/9 6/13 6/21 6/23

## 2022-06-28 ENCOUNTER — OFFICE VISIT (OUTPATIENT)
Dept: PHYSICAL THERAPY | Facility: CLINIC | Age: 62
End: 2022-06-28
Payer: COMMERCIAL

## 2022-06-28 DIAGNOSIS — M19.90 CHRONIC OSTEOARTHRITIS: ICD-10-CM

## 2022-06-28 DIAGNOSIS — M19.90 ARTHRITIS: ICD-10-CM

## 2022-06-28 DIAGNOSIS — S46.012D TRAUMATIC INCOMPLETE TEAR OF LEFT ROTATOR CUFF, SUBSEQUENT ENCOUNTER: Primary | ICD-10-CM

## 2022-06-28 DIAGNOSIS — M54.12 LEFT CERVICAL RADICULOPATHY: ICD-10-CM

## 2022-06-28 PROCEDURE — 97112 NEUROMUSCULAR REEDUCATION: CPT

## 2022-06-28 PROCEDURE — 97140 MANUAL THERAPY 1/> REGIONS: CPT

## 2022-06-28 PROCEDURE — 97110 THERAPEUTIC EXERCISES: CPT

## 2022-06-28 NOTE — PROGRESS NOTES
Daily Note     Today's date: 2022  Patient name: Micha Vides  : 1960  MRN: [id number]  Referring provider: Claudia Jamison MD  Dx:   Encounter Diagnosis     ICD-10-CM    1  Traumatic incomplete tear of left rotator cuff, subsequent encounter  S46 012D    2  Left cervical radiculopathy  M54 12    3  Chronic osteoarthritis  M19 90    4  Arthritis  M19 90        Start Time: 9812  Stop Time: 1018  Total time in clinic (min): 46 minutes    Subjective: Pt reports she does not have any shoulder or neck pain today, just tightness  Objective: See treatment diary below      Assessment: Tolerated treatment well  Patient shows improvement with decrease in painful symptoms and FOTO score improvement to 59/60  Pt displayed median and ulnar nerve tightness on the LUE reproducing her symptoms today; following central nerve gliders, pt displayed improvement in neural tension with both  Resisted ER was performed this session with no adverse symptoms  Pt will continue to benefit from physical therapy to improve function and pain  Continue to progress pt as tolerated next visit  Plan: Continue per plan of care        Precautions: PVD, rotator cuff rupture L shoulder, anxiety, heart palpitations, herniated IV disc, chronic OA      Manuals       Cervical lateral glides L to R C4-C7, T1 PA; nv L to R C3-C7, T1 PA   L to R C3-C7, T1 PA        Manual LUE nerve glides (all) nv 1x20 each    median nerve bias central gliders; trial radial/ulnar nv Median/ulnar nerve bias central gliders LUE                                Neuro Re-Ed       Scapular retraction 1x10; HEP            No monies 1x10 ptb; HEP    1x15 gtb 1x15 gtb       Shoulder abduction iso 1x10; HEP    2x10 5" hold 1x10 5" hold 1x10 5" hold      Rows/ext nv 10# 2x15 each 11# 1x15 each 13# 1x15 each 10# 2x15        Chin tucks nv 5" hold 1x15   1x10 5" hold  1x15 5" hold       Resisted ER/IR nv rtb 2x10 each side gtb 1x15 each side btb 1x15 each btb 1x15 ER  rtb 1x15 ER/IR each      SA supine press    3# 1x15         Prone I, T, Y    1x10 each         ER/IR iso     1x10 each 5" hold 1x10 each 5" hold       Chin tuck with extension     1x10 1x10; PT OP nv 2x10      Middle trap pull aparts    nv         Ther Ex 5/31 6/7 6/9 6/13 6/21 6/23 6/28      SNAGS HEP            pulleys nv 5' 5' 5' 5'        Thoracic ext nv Over foam roll 2x10           Finger ladders nv            Supine shoulder flexion nv 4# weight bar 1x10  4# weight bar 2x10         sidelying abduction nv 1x10 2# 2x10 2# 3# 2x10         UBE      4' fwd 5' fwd      Wall slides       2x10 with towel      Ther Activity 5/31 6/7 6/9 6/13 6/21 6/23 6/28      Wall slides nv  2x10 2x7 ptb abduction 2x10 with towel 2x10 with towel       Pt edu   NS NS NS NS       Gait Training 5/31 6/7 6/9 6/13 6/21 6/23 6/28                                Modalities 5/31 6/7 6/9 6/13 6/21 6/23 6/28

## 2022-06-29 ENCOUNTER — HOSPITAL ENCOUNTER (OUTPATIENT)
Dept: MRI IMAGING | Facility: HOSPITAL | Age: 62
Discharge: HOME | End: 2022-06-29
Attending: ORTHOPAEDIC SURGERY
Payer: MEDICARE

## 2022-06-29 DIAGNOSIS — S46.012D TRAUMATIC INCOMPLETE TEAR OF LEFT ROTATOR CUFF, SUBSEQUENT ENCOUNTER: ICD-10-CM

## 2022-06-29 PROCEDURE — G1004 CDSM NDSC: HCPCS

## 2022-06-29 PROCEDURE — 73221 MRI JOINT UPR EXTREM W/O DYE: CPT

## 2022-06-30 ENCOUNTER — APPOINTMENT (OUTPATIENT)
Dept: PHYSICAL THERAPY | Facility: CLINIC | Age: 62
End: 2022-06-30
Payer: COMMERCIAL

## 2022-06-30 DIAGNOSIS — F41.9 ANXIETY: ICD-10-CM

## 2022-06-30 RX ORDER — SERTRALINE HYDROCHLORIDE 100 MG/1
100 TABLET, FILM COATED ORAL DAILY
Qty: 90 TABLET | Refills: 0 | Status: SHIPPED | OUTPATIENT
Start: 2022-06-30

## 2022-07-05 ENCOUNTER — OFFICE VISIT (OUTPATIENT)
Dept: PHYSICAL THERAPY | Facility: CLINIC | Age: 62
End: 2022-07-05
Payer: COMMERCIAL

## 2022-07-05 DIAGNOSIS — M54.12 LEFT CERVICAL RADICULOPATHY: ICD-10-CM

## 2022-07-05 DIAGNOSIS — M19.90 ARTHRITIS: ICD-10-CM

## 2022-07-05 DIAGNOSIS — S46.012D TRAUMATIC INCOMPLETE TEAR OF LEFT ROTATOR CUFF, SUBSEQUENT ENCOUNTER: Primary | ICD-10-CM

## 2022-07-05 DIAGNOSIS — M19.90 CHRONIC OSTEOARTHRITIS: ICD-10-CM

## 2022-07-05 PROCEDURE — 97530 THERAPEUTIC ACTIVITIES: CPT

## 2022-07-05 PROCEDURE — 97112 NEUROMUSCULAR REEDUCATION: CPT

## 2022-07-05 PROCEDURE — 97110 THERAPEUTIC EXERCISES: CPT

## 2022-07-05 NOTE — PROGRESS NOTES
Daily Note     Today's date: 2022  Patient name: Herb Sotomayor  : 1960  MRN: [id number]  Referring provider: Keisha Isidro MD  Dx:   Encounter Diagnosis     ICD-10-CM    1  Traumatic incomplete tear of left rotator cuff, subsequent encounter  S46 012D    2  Left cervical radiculopathy  M54 12    3  Arthritis  M19 90    4  Chronic osteoarthritis  M19 90        Start Time: 8304  Stop Time: 1008  Total time in clinic (min): 45 minutes    Subjective: Pt reports she is doing well with no new complaints  Objective: See treatment diary below      Assessment: Tolerated treatment well  Patient displayed full ROM of L shoulder this session with some minor limitation at end range of shoulder abduction that increased with increased repetitions  Pain was noted with 1# for shoulder abduction, so exercise was regressed to no weight, which pt reported only tightness during  No adverse symptoms were reported with increased resistance during ER  Central gliders were performed with decreased nerve tension noted with median nerve bias  Continue to progress pt as tolerated next visit  Plan: Continue per plan of care        Precautions: PVD, rotator cuff rupture L shoulder, anxiety, heart palpitations, herniated IV disc, chronic OA      Manuals  7/     Cervical lateral glides L to R C4-C7, T1 PA; nv L to R C3-C7, T1 PA   L to R C3-C7, T1 PA        Manual LUE nerve glides (all) nv 1x20 each    median nerve bias central gliders; trial radial/ulnar nv Median/ulnar nerve bias central gliders LUE median nerve bias central gliders LUE     GH mob        nv                  Neuro Re-Ed  7/5     Scapular retraction 1x10; HEP            No monies 1x10 ptb; HEP    1x15 gtb 1x15 gtb       Shoulder abduction iso 1x10; HEP    2x10 5" hold 1x10 5" hold 1x10 5" hold      Rows/ext nv 10# 2x15 each 11# 1x15 each 13# 1x15 each 10# 2x15        Chin tucks nv 5" hold 1x15   1x10 5" hold  1x15 5" hold  2x10 with no monies ptb     Resisted ER/IR nv rtb 2x10 each side gtb 1x15 each side btb 1x15 each btb 1x15 ER  rtb 1x15 ER/IR each gtb 2x10 each side ER     SA supine press    3# 1x15         Prone I, T, Y    1x10 each         ER/IR iso     1x10 each 5" hold 1x10 each 5" hold       Chin tuck with extension     1x10 1x10; PT OP nv 2x10      Middle trap pull aparts    nv         Ther Ex 5/31 6/7 6/9 6/13 6/21 6/23 6/28 7/5     SNAGS HEP            pulleys nv 5' 5' 5' 5'        Shoulder abduction        2x10      Shoulder flexion        1# 2x10     Thoracic ext nv Over foam roll 2x10                        Supine shoulder flexion nv 4# weight bar 1x10  4# weight bar 2x10         sidelying abduction nv 1x10 2# 2x10 2# 3# 2x10         UBE      4' fwd 5' fwd 3'/3'     Wall slides       2x10 with towel      Ther Activity 5/31 6/7 6/9 6/13 6/21 6/23 6/28 7/5     Wall slides nv  2x10 2x7 ptb abduction 2x10 with towel 2x10 with towel       Finger ladders        2x5 lateral     Pt edu   NS NS NS NS  NS     Gait Training 5/31 6/7 6/9 6/13 6/21 6/23 6/28 7/5                               Modalities 5/31 6/7 6/9 6/13 6/21 6/23 6/28 7/5

## 2022-07-07 ENCOUNTER — OFFICE VISIT (OUTPATIENT)
Dept: PHYSICAL THERAPY | Facility: CLINIC | Age: 62
End: 2022-07-07
Payer: COMMERCIAL

## 2022-07-07 DIAGNOSIS — M54.12 LEFT CERVICAL RADICULOPATHY: ICD-10-CM

## 2022-07-07 DIAGNOSIS — S46.012D TRAUMATIC INCOMPLETE TEAR OF LEFT ROTATOR CUFF, SUBSEQUENT ENCOUNTER: Primary | ICD-10-CM

## 2022-07-07 DIAGNOSIS — M19.90 CHRONIC OSTEOARTHRITIS: ICD-10-CM

## 2022-07-07 DIAGNOSIS — M19.90 ARTHRITIS: ICD-10-CM

## 2022-07-07 PROCEDURE — 97112 NEUROMUSCULAR REEDUCATION: CPT

## 2022-07-07 PROCEDURE — 97110 THERAPEUTIC EXERCISES: CPT

## 2022-07-07 NOTE — PROGRESS NOTES
Daily Note     Today's date: 2022  Patient name: Carina Ruiz  : 1960  MRN: [id number]  Referring provider: Lawyer Simmonds, MD  Dx:   Encounter Diagnosis     ICD-10-CM    1  Traumatic incomplete tear of left rotator cuff, subsequent encounter  S46 012D    2  Left cervical radiculopathy  M54 12    3  Arthritis  M19 90    4  Chronic osteoarthritis  M19 90        Start Time:   Stop Time: 1003  Total time in clinic (min): 32 minutes    Subjective: Pt reports she had a little soreness yesterday following Tuesday's session  Objective: See treatment diary below      Assessment: Tolerated treatment well  Patient showed improvement in shoulder abduction ROM during lateral finger ladder walks  Pt tolerated increased repetitions of resisted ER with reports of muscle fatigue and burning during exercise  Rows and resisted extension were reintroduced today with verbal cuing for form  Shoulder flexion with weight was progressed this session with increased weight; pt reported scapular cracking/popping and weakness but no pain  Shoulder abduction was also progressed with increased weight; full ROM was achieved against gravity with added weight  Continue to progress pt as tolerated next visit  Plan: Continue per plan of care        Precautions: PVD, rotator cuff rupture L shoulder, anxiety, heart palpitations, herniated IV disc, chronic OA      Manuals     Cervical lateral glides L to R C4-C7, T1 PA; nv L to R C3-C7, T1 PA   L to R C3-C7, T1 PA        Manual LUE nerve glides (all) nv 1x20 each    median nerve bias central gliders; trial radial/ulnar nv Median/ulnar nerve bias central gliders LUE median nerve bias central gliders LUE     GH mob        nv                  Neuro Re-Ed     Scapular retraction 1x10; HEP            No monies 1x10 ptb; HEP    1x15 gtb 1x15 gtb       Shoulder abduction iso 1x10; HEP    2x10 5" hold 1x10 5" hold 1x10 5" hold      Rows/ext nv 10# 2x15 each 11# 1x15 each 13# 1x15 each 10# 2x15    11# 1x15 each     Chin tucks nv 5" hold 1x15   1x10 5" hold  1x15 5" hold  2x10 with no monies ptb     Resisted ER/IR nv rtb 2x10 each side gtb 1x15 each side btb 1x15 each btb 1x15 ER  rtb 1x15 ER/IR each gtb 2x10 each side ER gtb 1x15 ER/IR    SA supine press    3# 1x15         Prone I, T, Y    1x10 each     nv    ER/IR iso     1x10 each 5" hold 1x10 each 5" hold       Chin tuck with extension     1x10 1x10; PT OP nv 2x10      Middle trap pull aparts    nv     ptb 2x10    Ther Ex 5/31 6/7 6/9 6/13 6/21 6/23 6/28 7/5 7/7    SNAGS HEP            pulleys nv 5' 5' 5' 5'        Shoulder abduction        2x10  1x10 no wt, 1x10 1#    Shoulder flexion        1# 2x10 2# 2x10    Thoracic ext nv Over foam roll 2x10                        Supine shoulder flexion nv 4# weight bar 1x10  4# weight bar 2x10         sidelying abduction nv 1x10 2# 2x10 2# 3# 2x10         UBE      4' fwd 5' fwd 3'/3' 3'/3'    Wall slides       2x10 with towel      Ther Activity 5/31 6/7 6/9 6/13 6/21 6/23 6/28 7/5 7/7    Wall slides nv  2x10 2x7 ptb abduction 2x10 with towel 2x10 with towel       Finger ladders        2x5 lateral 2x5 lateral     Pt edu   NS NS NS NS  NS NS    Gait Training 5/31 6/7 6/9 6/13 6/21 6/23 6/28 7/5 7/7                              Modalities 5/31 6/7 6/9 6/13 6/21 6/23 6/28 7/5 7/7

## 2022-07-11 ENCOUNTER — OFFICE VISIT (OUTPATIENT)
Dept: OBGYN CLINIC | Facility: HOSPITAL | Age: 62
End: 2022-07-11
Payer: COMMERCIAL

## 2022-07-11 VITALS
SYSTOLIC BLOOD PRESSURE: 165 MMHG | WEIGHT: 133 LBS | HEART RATE: 65 BPM | HEIGHT: 67 IN | DIASTOLIC BLOOD PRESSURE: 82 MMHG | BODY MASS INDEX: 20.88 KG/M2

## 2022-07-11 DIAGNOSIS — S46.012D TRAUMATIC INCOMPLETE TEAR OF LEFT ROTATOR CUFF, SUBSEQUENT ENCOUNTER: Primary | ICD-10-CM

## 2022-07-11 PROCEDURE — 99213 OFFICE O/P EST LOW 20 MIN: CPT | Performed by: ORTHOPAEDIC SURGERY

## 2022-07-11 NOTE — PROGRESS NOTES
Orthopaedics Office Visit - follow up  Patient Visit    ASSESSMENT/PLAN:    Assessment:   57year old female with left supraspinatus tendinosis doing well with strength exercises     Plan:   Physical therapy for another 2-3 weeks   ROM of left shoulder actively and passively  WBAT LUE        To Do Next Visit:  Follow up PRN       _____________________________________________________  CHIEF COMPLAINT:  Chief Complaint   Patient presents with    Left Shoulder - Injury         SUBJECTIVE:  Camilla Bond is a 57 y o  female who presents for a follow up after receiving an MRI of her left shoulder for suspected rotator cuff injury  Patient has been seeing physical therapy twice weekly with good results  She feels like her pain is improved and her strength has improved   She denies any other symptoms at this time     SOCIAL HISTORY:  Social History     Tobacco Use    Smoking status: Current Every Day Smoker     Packs/day: 0 25     Years: 35 00     Pack years: 8 75     Types: Cigarettes    Smokeless tobacco: Current User   Vaping Use    Vaping Use: Never used   Substance Use Topics    Alcohol use: Yes     Comment: socially    Drug use: Yes     Types: Marijuana       MEDICATIONS:    Current Outpatient Medications:     aspirin (ECOTRIN LOW STRENGTH) 81 mg EC tablet, Take 81 mg by mouth, Disp: , Rfl:     propranolol (INDERAL) 40 mg tablet, TAKE 1 TABLET BY MOUTH TWICE A DAY, Disp: 180 tablet, Rfl: 0    sertraline (ZOLOFT) 100 mg tablet, Take 1 tablet (100 mg total) by mouth daily, Disp: 90 tablet, Rfl: 0    atorvastatin (LIPITOR) 40 mg tablet, Take 40 mg by mouth daily (Patient not taking: Reported on 7/11/2022), Disp: , Rfl:     ergocalciferol (VITAMIN D2) 50,000 units, Take 1 capsule (50,000 Units total) by mouth once a week (Patient not taking: Reported on 7/11/2022), Disp: 12 capsule, Rfl: 1    naproxen (NAPROSYN) 500 mg tablet, Take 1 tablet (500 mg total) by mouth 2 (two) times a day with meals (Patient not taking: Reported on 7/11/2022), Disp: 60 tablet, Rfl: 6    REVIEW OF SYSTEMS:  MSK: No joint pain   Neuro: No numbness or tintling   Pertinent items are otherwise noted in HPI  A comprehensive review of systems was otherwise negative     _____________________________________________________  PHYSICAL EXAMINATION:  Vital signs: /82   Pulse 65   Ht 5' 7" (1 702 m)   Wt 60 3 kg (133 lb)   BMI 20 83 kg/m²   General: No acute distress, awake and alert  Psychiatric: Mood and affect appear appropriate  HEENT: Trachea Midline, No torticollis, no apparent facial trauma  Cardiovascular: No audible murmurs;  Extremities appear perfused  Pulmonary: No audible wheezing or stridor  Skin: No open lesions; see further details (if any) below    MUSCULOSKELETAL EXAMINATION:  Extremities:  LUE  Skin intact  No Swelling about shoulder  Full range of motion about shoulder  No pain with Deyvi's test         _____________________________________________________  STUDIES REVIEWED:  I personally reviewed the images and interpretation is as follows:  MRI of left shoulder demonstrates supraspinatus tendinosis       PROCEDURES PERFORMED:  Bryce Fraga MD

## 2022-07-12 ENCOUNTER — OFFICE VISIT (OUTPATIENT)
Dept: PHYSICAL THERAPY | Facility: CLINIC | Age: 62
End: 2022-07-12
Payer: COMMERCIAL

## 2022-07-12 DIAGNOSIS — M19.90 CHRONIC OSTEOARTHRITIS: ICD-10-CM

## 2022-07-12 DIAGNOSIS — M19.90 ARTHRITIS: ICD-10-CM

## 2022-07-12 DIAGNOSIS — S46.012D TRAUMATIC INCOMPLETE TEAR OF LEFT ROTATOR CUFF, SUBSEQUENT ENCOUNTER: Primary | ICD-10-CM

## 2022-07-12 DIAGNOSIS — M54.12 LEFT CERVICAL RADICULOPATHY: ICD-10-CM

## 2022-07-12 PROCEDURE — 97530 THERAPEUTIC ACTIVITIES: CPT

## 2022-07-12 PROCEDURE — 97112 NEUROMUSCULAR REEDUCATION: CPT

## 2022-07-12 PROCEDURE — 97110 THERAPEUTIC EXERCISES: CPT

## 2022-07-12 NOTE — PROGRESS NOTES
Daily Note     Today's date: 2022  Patient name: Ramy Hammond  : 1960  MRN: [id number]  Referring provider: Bernabe Coffman MD  Dx:   Encounter Diagnosis     ICD-10-CM    1  Traumatic incomplete tear of left rotator cuff, subsequent encounter  S46 012D    2  Left cervical radiculopathy  M54 12    3  Arthritis  M19 90    4  Chronic osteoarthritis  M19 90        Start Time:   Stop Time: 912  Total time in clinic (min): 38 minutes    Subjective: Pt reports she's feeling well today with some soreness after last session, though pain continues to be minimal       Objective: See treatment diary below      Assessment: Tolerated treatment well  Patient progressed resisted ER/IR and rows/ext with increased resistance without adverse symptoms  Pt required increased rest breaks due to fatigue during middle trap pull aparts  Initial cuing was provided for introduction of wall slides with liftoff; pt displayed good shoulder ROM during liftoff  Prone I, T, and Y were also introduced this session with initial cuing for form required  Pt will continue to benefit from therapy to improve strength and decrease pain  Continue to progress pt as tolerated next visit  Plan: Continue per plan of care        Precautions: PVD, rotator cuff rupture L shoulder, anxiety, heart palpitations, herniated IV disc, chronic OA      Manuals    Cervical lateral glides L to R C4-C7, T1 PA; nv L to R C3-C7, T1 PA   L to R C3-C7, T1 PA        Manual LUE nerve glides (all) nv 1x20 each    median nerve bias central gliders; trial radial/ulnar nv Median/ulnar nerve bias central gliders LUE median nerve bias central gliders LUE     GH mob        nv                  Neuro Re-Ed    Scapular retraction 1x10; HEP            No monies 1x10 ptb; HEP    1x15 gtb 1x15 gtb       Shoulder abduction iso 1x10; HEP    2x10 5" hold 1x10 5" hold 1x10 5" hold Rows/ext nv 10# 2x15 each 11# 1x15 each 13# 1x15 each 10# 2x15    11# 1x15 each  12# 1x15 each   Chin tucks nv 5" hold 1x15   1x10 5" hold  1x15 5" hold  2x10 with no monies ptb     Resisted ER/IR nv rtb 2x10 each side gtb 1x15 each side btb 1x15 each btb 1x15 ER  rtb 1x15 ER/IR each gtb 2x10 each side ER gtb 1x15 ER/IR btb 1x15 ER/IR   SA supine press    3# 1x15         Prone I, T, Y    1x10 each     nv 1x10 each    ER/IR iso     1x10 each 5" hold 1x10 each 5" hold       Chin tuck with extension     1x10 1x10; PT OP nv 2x10      Middle trap pull aparts    nv     ptb 2x10 ptb 2x10   Ther Ex 5/31 6/7 6/9 6/13 6/21 6/23 6/28 7/5 7/7 7/12   SNAGS HEP            pulleys nv 5' 5' 5' 5'        Shoulder abduction        2x10  1x10 no wt, 1x10 1# 1# 1x15   Shoulder flexion        1# 2x10 2# 2x10 2# 1x15   Thoracic ext nv Over foam roll 2x10                        Supine shoulder flexion nv 4# weight bar 1x10  4# weight bar 2x10         sidelying abduction nv 1x10 2# 2x10 2# 3# 2x10         UBE      4' fwd 5' fwd 3'/3' 3'/3' 3'/3'   Wall slides       2x10 with towel      Ther Activity 5/31 6/7 6/9 6/13 6/21 6/23 6/28 7/5 7/7 7/12   Wall slides nv  2x10 2x7 ptb abduction 2x10 with towel 2x10 with towel    2x10 with liftoff   Finger ladders        2x5 lateral 2x5 lateral     Pt edu   NS NS NS NS  NS NS NS   Gait Training 5/31 6/7 6/9 6/13 6/21 6/23 6/28 7/5 7/7 7/12                             Modalities 5/31 6/7 6/9 6/13 6/21 6/23 6/28 7/5 7/7 7/12

## 2022-07-14 ENCOUNTER — EVALUATION (OUTPATIENT)
Dept: PHYSICAL THERAPY | Facility: CLINIC | Age: 62
End: 2022-07-14
Payer: COMMERCIAL

## 2022-07-14 DIAGNOSIS — M19.90 CHRONIC OSTEOARTHRITIS: ICD-10-CM

## 2022-07-14 DIAGNOSIS — M54.12 LEFT CERVICAL RADICULOPATHY: ICD-10-CM

## 2022-07-14 DIAGNOSIS — S46.012D TRAUMATIC INCOMPLETE TEAR OF LEFT ROTATOR CUFF, SUBSEQUENT ENCOUNTER: Primary | ICD-10-CM

## 2022-07-14 DIAGNOSIS — M19.90 ARTHRITIS: ICD-10-CM

## 2022-07-14 PROCEDURE — 97164 PT RE-EVAL EST PLAN CARE: CPT

## 2022-07-14 PROCEDURE — 97110 THERAPEUTIC EXERCISES: CPT

## 2022-07-14 PROCEDURE — 97112 NEUROMUSCULAR REEDUCATION: CPT

## 2022-07-14 NOTE — PROGRESS NOTES
PT Re-evaluation     Today's date: 2022  Patient name: Jose Juan Harmon  : 1960  MRN: [id number]  Referring provider: Rylee Sylvester MD  Dx:   Encounter Diagnosis     ICD-10-CM    1  Traumatic incomplete tear of left rotator cuff, subsequent encounter  S46 012D    2  Left cervical radiculopathy  M54 12    3  Arthritis  M19 90    4  Chronic osteoarthritis  M19 90        Start Time: 9936  Stop Time: 1004  Total time in clinic (min): 33 minutes    Subjective: Pt reports she is doing well today with minimal to no pain in her neck and shoulder  She reports pain and discoloration in her foot associated with previous angioplasty performed last December  She called her previous physician who handled her surgery to report the issue and is waiting to hear back  Pain  Current pain ratin  At best pain ratin  At worst pain ratin  Quality: burning and throbbing  Relieving factors: medications, therapy  Aggravating factors: lifting and overhead activity (any time she uses her L arm)  Progression: significant improvement      Objective: See treatment diary below    Shoulder Comments   Cervical ROM  Flexion: WNL no pain  Extension: WNL  SB: WNL bilat no pain  Rot: 80 R, 72 L tightness at end range     UE ROM  Shoulder flex: WNL bilat  Shoulder Abd: WNL bilat  Functional ER: T5 bilat  Functional IR: T6 R, T2 L     UE strength  UT Shru/5 bilat  Shoulder flex: 4+/5 R, 4/5 L  Shoulder abd: 4+/5 R, 4/5 L  ER: 5/5 bilat  IR: 5/5 bilat  Elbow flex: 5/5 bilat  Elbow ext: 4+/5 R, 4/5 L  Gripping: symmetrical side to side      Assessment: Tolerated treatment well  Patient shows significant improvement compared to the initial evaluation with close to complete elimination of pain  Pt shows significant improvement in strength and ROM, with some slight strength deficits on the L side compared to the R side with shoulder flexion, shoulder abduction, and elbow extension   Pt also has some slight L cervical rotation tightness compared to the R side  Improvements in shoulder ROM were made with full L shoulder ROM achieved against gravity  Pt has met all short term goals and all but one long term goal  She is agreeable to potential discharge next week due to improvements seen above  Continue to progress pt as tolerated next visit  Goals  Short term goals (3-4 weeks)  1  Pt will display independence with understanding and performance of HEP to allow for carryover of plan of care at home  (met)  2  Pt will improve FOTO score from initial evaluation to show improvement in pain and function  (met)  3  Pt will increase L cervical rotation ROM by 10 degrees to assist with driving  (met)  4  Pt will increase L shoulder flexion strength to 3+/5 to improve reaching and lifting  (met)  5  Pt will increase L shoulder abduction strength to 4/5 to improve reaching and lifting  (met)  6  Pt will increase shoulder flexion and abduction AROM by 10 degrees to improve reaching and lifting  (met)    Long term goals (6-8 weeks)  1  Pt will score equal or better than projected score on FOTO to show improvement in pain and function  (met)  2  Pt will increase L cervical rotation ROM by 20 degrees to assist with driving  (met)  3  Pt will increase L shoulder flexion strength to 4/5 to improve reaching and lifting  (met)  4  Pt will increase L shoulder abduction strength to 4+/5 to improve reaching and lifting  (progressing)  5  Pt will increase shoulder flexion and abduction AROM by 20 degrees to improve reaching and lifting  (met)      Plan: Continue per plan of care        Precautions: PVD, rotator cuff rupture L shoulder, anxiety, heart palpitations, herniated IV disc, chronic OA      Manuals 7/14 6/13 6/21 6/23 6/28 7/5 7/7 7/12   Cervical lateral glides     L to R C3-C7, T1 PA        Manual LUE nerve glides (all)      median nerve bias central gliders; trial radial/ulnar nv Median/ulnar nerve bias central gliders LUE median nerve bias central gliders LUE     GH mob        nv                  Neuro Re-Ed 7/14 6/13 6/21 6/23 6/28 7/5 7/7 7/12   Scapular retraction             No monies     1x15 gtb 1x15 gtb       Shoulder abduction iso     2x10 5" hold 1x10 5" hold 1x10 5" hold      Rows/ext    13# 1x15 each 10# 2x15    11# 1x15 each  12# 1x15 each   Chin tucks     1x10 5" hold  1x15 5" hold  2x10 with no monies ptb     Resisted ER/IR    btb 1x15 each btb 1x15 ER  rtb 1x15 ER/IR each gtb 2x10 each side ER gtb 1x15 ER/IR btb 1x15 ER/IR   SA supine press    3# 1x15         Prone I, T, Y 1x15 I's, 1x10 T's and Y's   1x10 each     nv 1x10 each    ER/IR iso     1x10 each 5" hold 1x10 each 5" hold       Chin tuck with extension     1x10 1x10; PT OP nv 2x10      Middle trap pull aparts gtb 2x10   nv     ptb 2x10 ptb 2x10   Ther Ex 7/14 6/13 6/21 6/23 6/28 7/5 7/7 7/12   SNAGS             pulleys    5' 5'        Shoulder abduction        2x10  1x10 no wt, 1x10 1# 1# 1x15   Shoulder flexion        1# 2x10 2# 2x10 2# 1x15   Thoracic ext                          Supine shoulder flexion    4# weight bar 2x10         sidelying abduction    3# 2x10         UBE 3'/3'     4' fwd 5' fwd 3'/3' 3'/3' 3'/3'   Pt edu NS            Wall slides       2x10 with towel      Ther Activity 7/14 6/13 6/21 6/23 6/28 7/5 7/7 7/12   Wall slides    2x7 ptb abduction 2x10 with towel 2x10 with towel    2x10 with liftoff   Finger ladders        2x5 lateral 2x5 lateral     Pt edu    NS NS NS  NS NS NS   Gait Training 7/14 6/13 6/21 6/23 6/28 7/5 7/7 7/12                             Modalities 7/14 6/13 6/21 6/23 6/28 7/5 7/7 7/12

## 2022-07-19 ENCOUNTER — OFFICE VISIT (OUTPATIENT)
Dept: PHYSICAL THERAPY | Facility: CLINIC | Age: 62
End: 2022-07-19
Payer: COMMERCIAL

## 2022-07-19 DIAGNOSIS — M19.90 ARTHRITIS: ICD-10-CM

## 2022-07-19 DIAGNOSIS — M54.12 LEFT CERVICAL RADICULOPATHY: ICD-10-CM

## 2022-07-19 DIAGNOSIS — S46.012D TRAUMATIC INCOMPLETE TEAR OF LEFT ROTATOR CUFF, SUBSEQUENT ENCOUNTER: Primary | ICD-10-CM

## 2022-07-19 DIAGNOSIS — M19.90 CHRONIC OSTEOARTHRITIS: ICD-10-CM

## 2022-07-19 PROCEDURE — 97110 THERAPEUTIC EXERCISES: CPT

## 2022-07-19 PROCEDURE — 97112 NEUROMUSCULAR REEDUCATION: CPT

## 2022-07-19 NOTE — PROGRESS NOTES
Daily Note     Today's date: 2022  Patient name: Ryan Bynum  : 1960  MRN: [id number]  Referring provider: Berkley Calhoun MD  Dx:   Encounter Diagnosis     ICD-10-CM    1  Traumatic incomplete tear of left rotator cuff, subsequent encounter  S46 012D    2  Left cervical radiculopathy  M54 12    3  Arthritis  M19 90    4  Chronic osteoarthritis  M19 90        Start Time: 386  Stop Time: 1007  Total time in clinic (min): 36 minutes    Subjective: Pt reports she's doing well today with no new complaints  Objective: See treatment diary below      Assessment: Tolerated treatment well  Patient progressed resisted ER/IR with increased shoulder ROM and wall slides with increased resistance; pt tolerated exercises well with no adverse symptoms reported, only fatigue  Pt experienced fatigue following shoulder abduction with 2# this session  Cuing was provided for prone I, T, Y for form with the most difficulty and fatigue noted with prone Y's  Continue to progress pt as tolerated next visit  Potential discharge next visit  Plan: Continue per plan of care        Precautions: PVD, rotator cuff rupture L shoulder, anxiety, heart palpitations, herniated IV disc, chronic OA      Manuals    Cervical lateral glides     L to R C3-C7, T1 PA        Manual LUE nerve glides (all)      median nerve bias central gliders; trial radial/ulnar nv Median/ulnar nerve bias central gliders LUE median nerve bias central gliders LUE     GH mob        nv                  Neuro Re-Ed  7   Scapular retraction             No monies     1x15 gtb 1x15 gtb       Shoulder abduction iso     2x10 5" hold 1x10 5" hold 1x10 5" hold      Rows/ext    13# 1x15 each 10# 2x15    11# 1x15 each  12# 1x15 each   Chin tucks     1x10 5" hold  1x15 5" hold  2x10 with no monies ptb     Resisted ER/IR  rtb 90 degrees ER/IR 1x10  btb 1x15 each btb 1x15 ER  rtb 1x15 ER/IR each gtb 2x10 each side ER gtb 1x15 ER/IR btb 1x15 ER/IR   SA supine press    3# 1x15         Prone I, T, Y 1x15 I's, 1x10 T's and Y's 1x15 each   1x10 each     nv 1x10 each    ER/IR iso     1x10 each 5" hold 1x10 each 5" hold       Chin tuck with extension     1x10 1x10; PT OP nv 2x10      Middle trap pull aparts gtb 2x10   nv     ptb 2x10 ptb 2x10   Ther Ex 7/14 7/19 6/13 6/21 6/23 6/28 7/5 7/7 7/12   SNAGS             pulleys    5' 5'        Shoulder abduction  2# 1x15      2x10  1x10 no wt, 1x10 1# 1# 1x15   Shoulder flexion  2# 1x15      1# 2x10 2# 2x10 2# 1x15   Thoracic ext             tricep pushdowns  2x15 12#           Supine shoulder flexion    4# weight bar 2x10         sidelying abduction    3# 2x10         UBE 3'/3' 3'/3'    4' fwd 5' fwd 3'/3' 3'/3' 3'/3'   Pt edu NS            Wall slides       2x10 with towel      Ther Activity 7/14 7/19 6/13 6/21 6/23 6/28 7/5 7/7 7/12   Wall slides  1x10 with liftoff, 1x10 with light otb with liftoff  2x7 ptb abduction 2x10 with towel 2x10 with towel    2x10 with liftoff   Finger ladders        2x5 lateral 2x5 lateral     Pt edu  NS  NS NS NS  NS NS NS   Gait Training 7/14 7/19 6/13 6/21 6/23 6/28 7/5 7/7 7/12                             Modalities 7/14 7/19 6/13 6/21 6/23 6/28 7/5 7/7 7/12

## 2022-07-22 DIAGNOSIS — R00.2 PALPITATION: ICD-10-CM

## 2022-07-25 RX ORDER — PROPRANOLOL HYDROCHLORIDE 40 MG/1
TABLET ORAL
Qty: 180 TABLET | Refills: 0 | Status: SHIPPED | OUTPATIENT
Start: 2022-07-25 | End: 2022-10-24

## 2022-12-02 DIAGNOSIS — R00.2 PALPITATION: ICD-10-CM

## 2022-12-02 RX ORDER — PROPRANOLOL HYDROCHLORIDE 40 MG/1
TABLET ORAL
Qty: 180 TABLET | Refills: 1 | Status: SHIPPED | OUTPATIENT
Start: 2022-12-02

## 2022-12-02 RX ORDER — CILOSTAZOL 50 MG/1
100 TABLET ORAL 2 TIMES DAILY
COMMUNITY
Start: 2022-11-16

## 2023-01-31 PROBLEM — Z72.0 TOBACCO ABUSE: Status: ACTIVE | Noted: 2022-11-29

## 2023-01-31 PROBLEM — E78.9 LIPID DISORDER: Status: ACTIVE | Noted: 2022-11-29

## 2023-02-02 ENCOUNTER — OFFICE VISIT (OUTPATIENT)
Dept: FAMILY MEDICINE CLINIC | Facility: CLINIC | Age: 63
End: 2023-02-02

## 2023-02-02 VITALS
SYSTOLIC BLOOD PRESSURE: 140 MMHG | WEIGHT: 140.6 LBS | OXYGEN SATURATION: 98 % | BODY MASS INDEX: 22.07 KG/M2 | TEMPERATURE: 97.6 F | HEIGHT: 67 IN | RESPIRATION RATE: 16 BRPM | DIASTOLIC BLOOD PRESSURE: 70 MMHG | HEART RATE: 74 BPM

## 2023-02-02 DIAGNOSIS — F10.10 ALCOHOL ABUSE: ICD-10-CM

## 2023-02-02 DIAGNOSIS — F32.A ANXIETY AND DEPRESSION: ICD-10-CM

## 2023-02-02 DIAGNOSIS — Z53.20 BLOOD TEST DECLINED: ICD-10-CM

## 2023-02-02 DIAGNOSIS — F41.9 ANXIETY AND DEPRESSION: ICD-10-CM

## 2023-02-02 DIAGNOSIS — Z00.00 MEDICARE ANNUAL WELLNESS VISIT, SUBSEQUENT: Primary | ICD-10-CM

## 2023-02-02 DIAGNOSIS — F41.9 ANXIETY: ICD-10-CM

## 2023-02-02 DIAGNOSIS — E78.9 LIPID DISORDER: ICD-10-CM

## 2023-02-02 DIAGNOSIS — R00.2 HEART PALPITATIONS: ICD-10-CM

## 2023-02-02 DIAGNOSIS — I73.9 PERIPHERAL VASCULAR DISEASE (HCC): ICD-10-CM

## 2023-02-02 DIAGNOSIS — Z23 ENCOUNTER FOR IMMUNIZATION: ICD-10-CM

## 2023-02-02 DIAGNOSIS — Z72.0 TOBACCO ABUSE: ICD-10-CM

## 2023-02-02 RX ORDER — HYDROXYZINE HYDROCHLORIDE 10 MG/1
10 TABLET, FILM COATED ORAL EVERY 6 HOURS PRN
Qty: 30 TABLET | Refills: 0 | Status: SHIPPED | OUTPATIENT
Start: 2023-02-02

## 2023-02-02 RX ORDER — BUSPIRONE HYDROCHLORIDE 5 MG/1
5 TABLET ORAL 2 TIMES DAILY
Qty: 180 TABLET | Refills: 3 | Status: SHIPPED | OUTPATIENT
Start: 2023-02-02

## 2023-02-02 NOTE — PATIENT INSTRUCTIONS
Medicare Preventive Visit Patient Instructions  Thank you for completing your Welcome to Medicare Visit or Medicare Annual Wellness Visit today  Your next wellness visit will be due in one year (2/3/2024)  The screening/preventive services that you may require over the next 5-10 years are detailed below  Some tests may not apply to you based off risk factors and/or age  Screening tests ordered at today's visit but not completed yet may show as past due  Also, please note that scanned in results may not display below  Preventive Screenings:  Service Recommendations Previous Testing/Comments   Colorectal Cancer Screening  * Colonoscopy    * Fecal Occult Blood Test (FOBT)/Fecal Immunochemical Test (FIT)  * Fecal DNA/Cologuard Test  * Flexible Sigmoidoscopy Age: 39-70 years old   Colonoscopy: every 10 years (may be performed more frequently if at higher risk)  OR  FOBT/FIT: every 1 year  OR  Cologuard: every 3 years  OR  Sigmoidoscopy: every 5 years  Screening may be recommended earlier than age 39 if at higher risk for colorectal cancer  Also, an individualized decision between you and your healthcare provider will decide whether screening between the ages of 74-80 would be appropriate  Colonoscopy: Not on file  FOBT/FIT: Not on file  Cologuard: Not on file  Sigmoidoscopy: Not on file          Breast Cancer Screening Age: 36 years old  Frequency: every 1-2 years  Not required if history of left and right mastectomy Mammogram: Not on file        Cervical Cancer Screening Between the ages of 21-29, pap smear recommended once every 3 years  Between the ages of 33-67, can perform pap smear with HPV co-testing every 5 years     Recommendations may differ for women with a history of total hysterectomy, cervical cancer, or abnormal pap smears in past  Pap Smear: Not on file        Hepatitis C Screening Once for adults born between [facility]  More frequently in patients at high risk for Hepatitis C Hep C Antibody: Not on file    Screening Current   Diabetes Screening 1-2 times per year if you're at risk for diabetes or have pre-diabetes Fasting glucose: No results in last 5 years (No results in last 5 years)  A1C: No results in last 5 years (No results in last 5 years)      Cholesterol Screening Once every 5 years if you don't have a lipid disorder  May order more often based on risk factors  Lipid panel: Not on file          Other Preventive Screenings Covered by Medicare:  1  Abdominal Aortic Aneurysm (AAA) Screening: covered once if your at risk  You're considered to be at risk if you have a family history of AAA  2  Lung Cancer Screening: covers low dose CT scan once per year if you meet all of the following conditions: (1) Age 50-69; (2) No signs or symptoms of lung cancer; (3) Current smoker or have quit smoking within the last 15 years; (4) You have a tobacco smoking history of at least 20 pack years (packs per day multiplied by number of years you smoked); (5) You get a written order from a healthcare provider  3  Glaucoma Screening: covered annually if you're considered high risk: (1) You have diabetes OR (2) Family history of glaucoma OR (3)  aged 48 and older OR (3)  American aged 72 and older  3  Osteoporosis Screening: covered every 2 years if you meet one of the following conditions: (1) You're estrogen deficient and at risk for osteoporosis based off medical history and other findings; (2) Have a vertebral abnormality; (3) On glucocorticoid therapy for more than 3 months; (4) Have primary hyperparathyroidism; (5) On osteoporosis medications and need to assess response to drug therapy  · Last bone density test (DXA Scan): Not on file  5  HIV Screening: covered annually if you're between the age of 12-76  Also covered annually if you are younger than 13 and older than 72 with risk factors for HIV infection   For pregnant patients, it is covered up to 3 times per pregnancy  Immunizations:  Immunization Recommendations   Influenza Vaccine Annual influenza vaccination during flu season is recommended for all persons aged >= 6 months who do not have contraindications   Pneumococcal Vaccine   * Pneumococcal conjugate vaccine = PCV13 (Prevnar 13), PCV15 (Vaxneuvance), PCV20 (Prevnar 20)  * Pneumococcal polysaccharide vaccine = PPSV23 (Pneumovax) Adults 25-60 years old: 1-3 doses may be recommended based on certain risk factors  Adults 72 years old: 1-2 doses may be recommended based off what pneumonia vaccine you previously received   Hepatitis B Vaccine 3 dose series if at intermediate or high risk (ex: diabetes, end stage renal disease, liver disease)   Tetanus (Td) Vaccine - COST NOT COVERED BY MEDICARE PART B Following completion of primary series, a booster dose should be given every 10 years to maintain immunity against tetanus  Td may also be given as tetanus wound prophylaxis  Tdap Vaccine - COST NOT COVERED BY MEDICARE PART B Recommended at least once for all adults  For pregnant patients, recommended with each pregnancy  Shingles Vaccine (Shingrix) - COST NOT COVERED BY MEDICARE PART B  2 shot series recommended in those aged 48 and above     Health Maintenance Due:      Topic Date Due   • HIV Screening  Never done   • Cervical Cancer Screening  03/02/2023 (Originally [birth year]3/1980)   • Colorectal Cancer Screening  02/02/2024 (Originally 11/3/2004)   • Breast Cancer Screening: Mammogram  02/02/2024 (Originally 11/3/1999)   • Hepatitis C Screening  Completed     Immunizations Due:      Topic Date Due   • Pneumococcal Vaccine: Pediatrics (0 to 5 Years) and At-Risk Patients (6 to 59 Years) (1 - PCV) Never done   • COVID-19 Vaccine (3 - Booster for Pfizer series) 07/05/2021   • Influenza Vaccine (1) Never done     Advance Directives   What are advance directives? Advance directives are legal documents that state your wishes and plans for medical care   These plans are made ahead of time in case you lose your ability to make decisions for yourself  Advance directives can apply to any medical decision, such as the treatments you want, and if you want to donate organs  What are the types of advance directives? There are many types of advance directives, and each state has rules about how to use them  You may choose a combination of any of the following:  · Living will: This is a written record of the treatment you want  You can also choose which treatments you do not want, which to limit, and which to stop at a certain time  This includes surgery, medicine, IV fluid, and tube feedings  · Durable power of  for healthcare [place] [facility]): This is a written record that states who you want to make healthcare choices for you when you are unable to make them for yourself  This person, called a proxy, is usually a family member or a friend  You may choose more than 1 proxy  · Do not resuscitate (DNR) order:  A DNR order is used in case your heart stops beating or you stop breathing  It is a request not to have certain forms of treatment, such as CPR  A DNR order may be included in other types of advance directives  · Medical directive: This covers the care that you want if you are in a coma, near death, or unable to make decisions for yourself  You can list the treatments you want for each condition  Treatment may include pain medicine, surgery, blood transfusions, dialysis, IV or tube feedings, and a ventilator (breathing machine)  · Values history: This document has questions about your views, beliefs, and how you feel and think about life  This information can help others choose the care that you would choose  Why are advance directives important? An advance directive helps you control your care  Although spoken wishes may be used, it is better to have your wishes written down  Spoken wishes can be misunderstood, or not followed   Treatments may be given even if you do not want them  An advance directive may make it easier for your family to make difficult choices about your care  Cigarette Smoking and Your Health   Risks to your health if you smoke:  Nicotine and other chemicals found in tobacco damage every cell in your body  Even if you are a light smoker, you have an increased risk for cancer, heart disease, and lung disease  If you are pregnant or have diabetes, smoking increases your risk for complications  Benefits to your health if you stop smoking:   · You decrease respiratory symptoms such as coughing, wheezing, and shortness of breath  · You reduce your risk for cancers of the lung, mouth, throat, kidney, bladder, pancreas, stomach, and cervix  If you already have cancer, you increase the benefits of chemotherapy  You also reduce your risk for cancer returning or a second cancer from developing  · You reduce your risk for heart disease, blood clots, heart attack, and stroke  · You reduce your risk for lung infections, and diseases such as pneumonia, asthma, chronic bronchitis, and emphysema  · Your circulation improves  More oxygen can be delivered to your body  If you have diabetes, you lower your risk for complications, such as kidney, artery, and eye diseases  You also lower your risk for nerve damage  Nerve damage can lead to amputations, poor vision, and blindness  · You improve your body's ability to heal and to fight infections  For more information and support to stop smoking:   · Smoke[employer]  gov  Phone: 3- 634 - 364-5072  Web Address: [employer]  How to Quit Using Smokeless Tobacco   Why it is important to stop using smokeless tobacco:  Smokeless tobacco comes in many forms  Examples include chew, snuff, dip, dissolvable tobacco, and snus  All smokeless tobacco products contain nicotine and may contain as much nicotine as 3 cigarettes  You may be physically dependent on nicotine  You may also be emotionally addicted to it   The cravings can be strong, but it is important to quit using smokeless tobacco  You will improve your health and decrease your cancer, stroke, and heart attack risk  Mouth sores and tooth problems will also improve when you quit  You can benefit from quitting no matter how long you have used smokeless tobacco    Prepare to stop using smokeless tobacco:  Nicotine is a highly addictive drug  Withdrawal symptoms can happen when you stop and make it hard to quit  The following can help keep you on track:  · Set a quit date  · Tell friends, family, and coworkers that you plan to quit  · Remove all smokeless tobacco products from your home, car, and workplace  Manage weight gain after you quit:  Nicotine can affect your metabolism  You may gain a few pounds after you quit  The following can help you control your weight:  · Eat healthy foods  · Drink water before, during, and between meals  · Exercise as directed  © Copyright [employer] 2018 Information is for End User's use only and may not be sold, redistributed or otherwise used for commercial purposes  All illustrations and images included in CareNotes® are the copyrighted property of [employer]  or [employer] [facility] Preventive Visit Patient Instructions  Thank you for completing your Welcome to Medicare Visit or Medicare Annual Wellness Visit today  Your next wellness visit will be due in one year (2/3/2024)  The screening/preventive services that you may require over the next 5-10 years are detailed below  Some tests may not apply to you based off risk factors and/or age  Screening tests ordered at today's visit but not completed yet may show as past due  Also, please note that scanned in results may not display below    Preventive Screenings:  Service Recommendations Previous Testing/Comments   Colorectal Cancer Screening  * Colonoscopy    * Fecal Occult Blood Test (FOBT)/Fecal Immunochemical Test (FIT)  * Fecal DNA/Cologuard Test  * Flexible Sigmoidoscopy Age: 39-70 years old   Colonoscopy: every 10 years (may be performed more frequently if at higher risk)  OR  FOBT/FIT: every 1 year  OR  Cologuard: every 3 years  OR  Sigmoidoscopy: every 5 years  Screening may be recommended earlier than age 39 if at higher risk for colorectal cancer  Also, an individualized decision between you and your healthcare provider will decide whether screening between the ages of 74-80 would be appropriate  Colonoscopy: Not on file  FOBT/FIT: Not on file  Cologuard: Not on file  Sigmoidoscopy: Not on file          Breast Cancer Screening Age: 36 years old  Frequency: every 1-2 years  Not required if history of left and right mastectomy Mammogram: Not on file        Cervical Cancer Screening Between the ages of 21-29, pap smear recommended once every 3 years  Between the ages of 33-67, can perform pap smear with HPV co-testing every 5 years  Recommendations may differ for women with a history of total hysterectomy, cervical cancer, or abnormal pap smears in past  Pap Smear: Not on file        Hepatitis C Screening Once for adults born between 1945 and 1965  More frequently in patients at high risk for Hepatitis C Hep C Antibody: Not on file    Screening Current   Diabetes Screening 1-2 times per year if you're at risk for diabetes or have pre-diabetes Fasting glucose: No results in last 5 years (No results in last 5 years)  A1C: No results in last 5 years (No results in last 5 years)      Cholesterol Screening Once every 5 years if you don't have a lipid disorder  May order more often based on risk factors  Lipid panel: Not on file          Other Preventive Screenings Covered by Medicare:  6  Abdominal Aortic Aneurysm (AAA) Screening: covered once if your at risk  You're considered to be at risk if you have a family history of AAA    7  Lung Cancer Screening: covers low dose CT scan once per year if you meet all of the following conditions: (1) Age 50-69; (2) No signs or symptoms of lung cancer; (3) Current smoker or have quit smoking within the last 15 years; (4) You have a tobacco smoking history of at least 20 pack years (packs per day multiplied by number of years you smoked); (5) You get a written order from a healthcare provider  8  Glaucoma Screening: covered annually if you're considered high risk: (1) You have diabetes OR (2) Family history of glaucoma OR (3)  aged 48 and older OR (3)  American aged 72 and older  5  Osteoporosis Screening: covered every 2 years if you meet one of the following conditions: (1) You're estrogen deficient and at risk for osteoporosis based off medical history and other findings; (2) Have a vertebral abnormality; (3) On glucocorticoid therapy for more than 3 months; (4) Have primary hyperparathyroidism; (5) On osteoporosis medications and need to assess response to drug therapy  · Last bone density test (DXA Scan): Not on file  10  HIV Screening: covered annually if you're between the age of 12-76  Also covered annually if you are younger than 13 and older than 72 with risk factors for HIV infection  For pregnant patients, it is covered up to 3 times per pregnancy      Immunizations:  Immunization Recommendations   Influenza Vaccine Annual influenza vaccination during flu season is recommended for all persons aged >= 6 months who do not have contraindications   Pneumococcal Vaccine   * Pneumococcal conjugate vaccine = PCV13 (Prevnar 13), PCV15 (Vaxneuvance), PCV20 (Prevnar 20)  * Pneumococcal polysaccharide vaccine = PPSV23 (Pneumovax) Adults 25-60 years old: 1-3 doses may be recommended based on certain risk factors  Adults 72 years old: 1-2 doses may be recommended based off what pneumonia vaccine you previously received   Hepatitis B Vaccine 3 dose series if at intermediate or high risk (ex: diabetes, end stage renal disease, liver disease)   Tetanus (Td) Vaccine - COST NOT COVERED BY MEDICARE PART B Following completion of primary series, a booster dose should be given every 10 years to maintain immunity against tetanus  Td may also be given as tetanus wound prophylaxis  Tdap Vaccine - COST NOT COVERED BY MEDICARE PART B Recommended at least once for all adults  For pregnant patients, recommended with each pregnancy  Shingles Vaccine (Shingrix) - COST NOT COVERED BY MEDICARE PART B  2 shot series recommended in those aged 48 and above     Health Maintenance Due:      Topic Date Due   • HIV Screening  Never done   • Cervical Cancer Screening  03/02/2023 (Originally [birth year]3/1980)   • Colorectal Cancer Screening  02/02/2024 (Originally 11/3/2004)   • Breast Cancer Screening: Mammogram  02/02/2024 (Originally 11/3/1999)   • Hepatitis C Screening  Completed     Immunizations Due:      Topic Date Due   • Pneumococcal Vaccine: Pediatrics (0 to 5 Years) and At-Risk Patients (6 to 59 Years) (1 - PCV) Never done   • COVID-19 Vaccine (3 - Booster for Pfizer series) 07/05/2021   • Influenza Vaccine (1) Never done     Advance Directives   What are advance directives? Advance directives are legal documents that state your wishes and plans for medical care  These plans are made ahead of time in case you lose your ability to make decisions for yourself  Advance directives can apply to any medical decision, such as the treatments you want, and if you want to donate organs  What are the types of advance directives? There are many types of advance directives, and each state has rules about how to use them  You may choose a combination of any of the following:  · Living will: This is a written record of the treatment you want  You can also choose which treatments you do not want, which to limit, and which to stop at a certain time  This includes surgery, medicine, IV fluid, and tube feedings  · Durable power of  for healthcare [place] [facility]):   This is a written record that states who you want to make healthcare choices for you when you are unable to make them for yourself  This person, called a proxy, is usually a family member or a friend  You may choose more than 1 proxy  · Do not resuscitate (DNR) order:  A DNR order is used in case your heart stops beating or you stop breathing  It is a request not to have certain forms of treatment, such as CPR  A DNR order may be included in other types of advance directives  · Medical directive: This covers the care that you want if you are in a coma, near death, or unable to make decisions for yourself  You can list the treatments you want for each condition  Treatment may include pain medicine, surgery, blood transfusions, dialysis, IV or tube feedings, and a ventilator (breathing machine)  · Values history: This document has questions about your views, beliefs, and how you feel and think about life  This information can help others choose the care that you would choose  Why are advance directives important? An advance directive helps you control your care  Although spoken wishes may be used, it is better to have your wishes written down  Spoken wishes can be misunderstood, or not followed  Treatments may be given even if you do not want them  An advance directive may make it easier for your family to make difficult choices about your care  Cigarette Smoking and Your Health   Risks to your health if you smoke:  Nicotine and other chemicals found in tobacco damage every cell in your body  Even if you are a light smoker, you have an increased risk for cancer, heart disease, and lung disease  If you are pregnant or have diabetes, smoking increases your risk for complications  Benefits to your health if you stop smoking:   · You decrease respiratory symptoms such as coughing, wheezing, and shortness of breath  · You reduce your risk for cancers of the lung, mouth, throat, kidney, bladder, pancreas, stomach, and cervix  If you already have cancer, you increase the benefits of chemotherapy   You also reduce your risk for cancer returning or a second cancer from developing  · You reduce your risk for heart disease, blood clots, heart attack, and stroke  · You reduce your risk for lung infections, and diseases such as pneumonia, asthma, chronic bronchitis, and emphysema  · Your circulation improves  More oxygen can be delivered to your body  If you have diabetes, you lower your risk for complications, such as kidney, artery, and eye diseases  You also lower your risk for nerve damage  Nerve damage can lead to amputations, poor vision, and blindness  · You improve your body's ability to heal and to fight infections  For more information and support to stop smoking:   · [employer]  Phone: 3- 631 - 110-1331  Web Address: [employer]  How to Quit Using Smokeless Tobacco   Why it is important to stop using smokeless tobacco:  Smokeless tobacco comes in many forms  Examples include chew, snuff, dip, dissolvable tobacco, and snus  All smokeless tobacco products contain nicotine and may contain as much nicotine as 3 cigarettes  You may be physically dependent on nicotine  You may also be emotionally addicted to it  The cravings can be strong, but it is important to quit using smokeless tobacco  You will improve your health and decrease your cancer, stroke, and heart attack risk  Mouth sores and tooth problems will also improve when you quit  You can benefit from quitting no matter how long you have used smokeless tobacco    Prepare to stop using smokeless tobacco:  Nicotine is a highly addictive drug  Withdrawal symptoms can happen when you stop and make it hard to quit  The following can help keep you on track:  · Set a quit date  · Tell friends, family, and coworkers that you plan to quit  · Remove all smokeless tobacco products from your home, car, and workplace  Manage weight gain after you quit:  Nicotine can affect your metabolism  You may gain a few pounds after you quit   The following can help you control your weight:  · Eat healthy foods  · Drink water before, during, and between meals  · Exercise as directed  © Copyright [employer] 2018 Information is for End User's use only and may not be sold, redistributed or otherwise used for commercial purposes   All illustrations and images included in CareNotes® are the copyrighted property of A D A M , Inc  or [address] [place] [address]

## 2023-02-02 NOTE — PROGRESS NOTES
Assessment and Plan:     Problem List Items Addressed This Visit        Cardiovascular and Mediastinum    PAD (peripheral artery disease) ([facility] [address] )     Current medications: Cilostazol 50mg    Followed by vascular surgery    · Ecchymotic 1st and 5th toe since 09/21  · 12/22 Abdominal aortogram with femoral runoff, left  · Findings: LLE proximal SFA occlusion to popliteal artery, will require a bypass in the near future   · Appointment with vascular 2/9             Other    Alcohol abuse     Alcohol screen (+) AUDIT-C score 4  Admits to drinking one glass of liliane a night   Discussed risk of drinking and educated patient on resources to help decrease the amount consumed  Anxiety and depression     LUDIVINA-7 Score 15   PHQ9 Score 12  Emotional support provided  · Denies suicidal ideations or thoughts  Current medications: Zoloft 100mg  · New medication regimen: Zoloft 100mg, Buspar 5mg, Hydroxyzine 10mg PRN   Discussed lifestyle modifications and relaxation methods   Referral to behavioral health- Patient aware of extended wait times   Follow up in one month         Relevant Medications    busPIRone (BUSPAR) 5 mg tablet    hydrOXYzine HCL (ATARAX) 10 mg tablet    Encounter for immunization    Relevant Orders    influenza vaccine, quadrivalent, recombinant, PF, 0 5 mL, for patients 18 yr+ (FLUBLOK) (Completed)    Heart palpitations     Currently being seen by cardiology   Propanolol 40mg          Lipid disorder     Declined lipid panel screening   Risks and benefits discussed  Currently on Atorvastatin 40mg          Tobacco abuse     Current smoker   No intentions on quitting        Other Visit Diagnoses     Medicare annual wellness visit, subsequent    -  Primary    Blood test declined        Patient declined lab work (lipid and HIV) to be collected  Risks and benefits discussed  Couseling provided             Depression Screening and Follow-up Plan: Patient's depression screening was positive with a PHQ-2 score of 6  Their PHQ-9 score was 12  Preventive health issues were discussed with patient, and age appropriate screening tests were ordered as noted in patient's After Visit Summary  Personalized health advice and appropriate referrals for health education or preventive services given if needed, as noted in patient's After Visit Summary  History of Present Illness:     Patient presents for a Medicare Wellness Visit    Medicare annual wellness     Patient Care Team:  Arelia Primrose as PCP - General (Nurse Practitioner)     Review of Systems:     Review of Systems   Constitutional: Negative for activity change, chills, fatigue and fever  HENT: Negative for congestion, ear pain, rhinorrhea, sore throat and trouble swallowing  Eyes: Negative for pain and visual disturbance  Respiratory: Negative for cough, chest tightness and shortness of breath  Cardiovascular: Negative for chest pain, palpitations and leg swelling  Gastrointestinal: Negative for abdominal pain, constipation, diarrhea, nausea and vomiting  Genitourinary: Negative for difficulty urinating, dysuria, hematuria and urgency  Musculoskeletal: Negative for arthralgias and back pain  Skin: Negative for color change and rash  Neurological: Negative for dizziness, seizures, syncope and headaches  Psychiatric/Behavioral: Negative for dysphoric mood  The patient is not nervous/anxious  All other systems reviewed and are negative         Problem List:     Patient Active Problem List   Diagnosis   • Anxiety and depression   • Heart palpitations   • History of herniated intervertebral disc   • PAD (peripheral artery disease) ([facility])   • Unspecified rotator cuff tear or rupture of left shoulder, not specified as traumatic   • Lipid disorder   • Tobacco abuse   • Encounter for immunization   • Alcohol abuse      Past Medical and Surgical History:     Past Medical History:   Diagnosis Date   • Chronic osteoarthritis 03/17/2012     Past Surgical History:   Procedure Laterality Date   • ANGIOPLASTY Left    • LAPAROSCOPY        Family History:     History reviewed  No pertinent family history  Social History:     Social History     Socioeconomic History   • Marital status: Single     Spouse name: None   • Number of children: None   • Years of education: None   • Highest education level: None   Occupational History   • None   Tobacco Use   • Smoking status: Every Day     Packs/day: 0 25     Years: 35 00     Pack years: 8 75     Types: Cigarettes   • Smokeless tobacco: Current   Vaping Use   • Vaping Use: Never used   Substance and Sexual Activity   • Alcohol use: Yes     Comment: socially   • Drug use: Yes     Types: Marijuana   • Sexual activity: None   Other Topics Concern   • None   Social History Narrative    Consumes caffeine, coffee  Social Determinants of Health     Financial Resource Strain: Low Risk    • Difficulty of Paying Living Expenses: Not hard at all   Food Insecurity: Not on file   Transportation Needs: No Transportation Needs   • Lack of Transportation (Medical): No   • Lack of Transportation (Non-Medical):  No   Physical Activity: Not on file   Stress: Not on file   Social Connections: Not on file   Intimate Partner Violence: Not on file   Housing Stability: Not on file      Medications and Allergies:     Current Outpatient Medications   Medication Sig Dispense Refill   • aspirin (ECOTRIN LOW STRENGTH) 81 mg EC tablet Take 81 mg by mouth     • atorvastatin (LIPITOR) 40 mg tablet Take 40 mg by mouth daily     • busPIRone (BUSPAR) 5 mg tablet Take 1 tablet (5 mg total) by mouth 2 (two) times a day 180 tablet 3   • cilostazol (PLETAL) 50 mg tablet Take 100 mg by mouth 2 (two) times a day     • hydrOXYzine HCL (ATARAX) 10 mg tablet Take 1 tablet (10 mg total) by mouth every 6 (six) hours as needed for anxiety 30 tablet 0   • propranolol (INDERAL) 40 mg tablet TAKE 1 TABLET BY MOUTH TWICE A  tablet 1   • sertraline (ZOLOFT) 100 mg tablet TAKE 1 TABLET BY MOUTH EVERY DAY 30 tablet 0     No current facility-administered medications for this visit  Allergies   Allergen Reactions   • No Active Allergies       Immunizations:     Immunization History   Administered Date(s) Administered   • COVID-19 PFIZER VACCINE 0 3 ML IM 04/19/2021, 05/10/2021   • Influenza, recombinant, quadrivalent,injectable, preservative free 02/02/2023   • Tdap 11/27/2013      Health Maintenance:         Topic Date Due   • HIV Screening  Never done   • Cervical Cancer Screening  03/02/2023 (Originally 11/3/1980)   • Colorectal Cancer Screening  02/02/2024 (Originally 11/3/2004)   • Breast Cancer Screening: Mammogram  02/02/2024 (Originally 11/3/1999)   • Hepatitis C Screening  Completed         Topic Date Due   • Hepatitis A Vaccine (1 of 2 - Risk 2-dose series) Never done   • Pneumococcal Vaccine: Pediatrics (0 to 5 Years) and At-Risk Patients (6 to 59 Years) (1 - PCV) Never done   • Hepatitis B Vaccine (1 of 3 - Risk 3-dose series) Never done   • COVID-19 Vaccine (3 - Booster for Pfizer series) 07/05/2021      Medicare Screening Tests and Risk Assessments:     Sanju Dewey is here for her Subsequent Wellness visit  Last Medicare Wellness visit information reviewed, patient interviewed and updates made to the record today  Health Risk Assessment:   Patient rates overall health as good  Patient feels that their physical health rating is slightly worse  Patient is dissatisfied with their life  Eyesight was rated as slightly worse  Hearing was rated as slightly worse  Patient feels that their emotional and mental health rating is slightly worse  Patients states they are often angry  Patient states they are often unusually tired/fatigued  Pain experienced in the last 7 days has been none  Patient states that she has experienced no weight loss or gain in last 6 months  Depression Screening:   PHQ-2 Score: 0      Fall Risk Screening:    In the past year, patient has experienced: no history of falling in past year      Urinary Incontinence Screening:   Patient has not leaked urine accidently in the last six months  Home Safety:  Patient has trouble with stairs inside or outside of their home  Patient has working smoke alarms and has working carbon monoxide detector  Home safety hazards include: none  Nutrition:   Current diet is Regular  Medications:   Patient is currently taking over-the-counter supplements  OTC medications include: see medication list  Patient is able to manage medications  Activities of Daily Living (ADLs)/Instrumental Activities of Daily Living (IADLs):   Walk and transfer into and out of bed and chair?: Yes  Dress and groom yourself?: Yes    Bathe or shower yourself?: Yes    Feed yourself? Yes  Do your laundry/housekeeping?: Yes  Manage your money, pay your bills and track your expenses?: Yes  Make your own meals?: Yes    Do your own shopping?: Yes    Previous Hospitalizations:   Any hospitalizations or ED visits within the last 12 months?: No      Advance Care Planning:   Living will: No      PREVENTIVE SCREENINGS        Lung Cancer Screening:     General: Screening Not Indicated      Hepatitis C Screening:    General: Screening Current    Screening, Brief Intervention, and Referral to Treatment (SBIRT)    Screening  Typical number of drinks in a day: 1  Typical number of drinks in a week: 7  Interpretation: Low risk drinking behavior  AUDIT-C Screenin) How often did you have a drink containing alcohol in the past year? 4 or more times a week  2) How many drinks did you have on a typical day when you were drinking in the past year?  1 to 2  3) How often did you have 6 or more drinks on one occasion in the past year? never    AUDIT-C Score: 4  Interpretation: Score 3-12 (female): POSITIVE screen for alcohol misuse    AUDIT Screenin) How often during the last year have you found that you were not able to stop drinking once you had started? 1 - less than monthly  5) How often during the last year have you failed to do what was normally expected from you because of drinking? 0 - never  6) How often during the last year have you needed a first drink in the morning to get yourself going after a heavy drinking session? 0 - never  7) How often during the last year have you had a feeling of guilt or remorse after drinking? 2 - monthly  8) How often during the last year have you been unable to remember what happened the night before because you had been drinking? 0 - never  9) Have you or someone else been injured as a result of your drinking? 0 - no  10) Has a relative or friend or a doctor or another health worker been concerned about your drinking or suggested you cut down? 0 - no    AUDIT Score: 7  Interpretation: Low risk alcohol consumption    Single Item Drug Screening:  How often have you used an illegal drug (including marijuana) or a prescription medication for non-medical reasons in the past year? never    Single Item Drug Screen Score: 0  Interpretation: Negative screen for possible drug use disorder    No results found  Physical Exam:     /70 (BP Location: Left arm, Patient Position: Sitting, Cuff Size: Adult)   Pulse 74   Temp 97 6 °F (36 4 °C)   Resp 16   Ht 5' 7" (1 702 m)   Wt 63 8 kg (140 lb 9 6 oz)   SpO2 98%   BMI 22 02 kg/m²     Physical Exam  Vitals and nursing note reviewed  Constitutional:       General: She is not in acute distress  Appearance: Normal appearance  She is well-developed  HENT:      Head: Normocephalic and atraumatic  Right Ear: Tympanic membrane, ear canal and external ear normal  There is no impacted cerumen  Left Ear: Tympanic membrane, ear canal and external ear normal  There is no impacted cerumen        Nose: Nose normal       Mouth/Throat:      Mouth: Mucous membranes are moist    Eyes:      Conjunctiva/sclera: Conjunctivae normal       Pupils: Pupils are equal, round, and reactive to light  Cardiovascular:      Rate and Rhythm: Normal rate and regular rhythm  Pulses: Normal pulses  Heart sounds: Normal heart sounds  No murmur heard  Pulmonary:      Effort: Pulmonary effort is normal  No respiratory distress  Breath sounds: Normal breath sounds  Abdominal:      General: Bowel sounds are normal       Palpations: Abdomen is soft  Tenderness: There is no abdominal tenderness  Musculoskeletal:         General: Normal range of motion  Cervical back: Neck supple  Right lower leg: No edema  Left lower leg: No edema  Skin:     General: Skin is warm and dry  Capillary Refill: Capillary refill takes less than 2 seconds  Neurological:      General: No focal deficit present  Mental Status: She is alert and oriented to person, place, and time  Mental status is at baseline  Psychiatric:         Mood and Affect: Mood normal          Behavior: Behavior normal          Thought Content: Thought content normal          Judgment: Judgment normal         Patient Instructions       Medicare Preventive Visit Patient Instructions  Thank you for completing your Welcome to Medicare Visit or Medicare Annual Wellness Visit today  Your next wellness visit will be due in one year (2/3/2024)  The screening/preventive services that you may require over the next 5-10 years are detailed below  Some tests may not apply to you based off risk factors and/or age  Screening tests ordered at today's visit but not completed yet may show as past due  Also, please note that scanned in results may not display below    Preventive Screenings:  Service Recommendations Previous Testing/Comments   Colorectal Cancer Screening  * Colonoscopy    * Fecal Occult Blood Test (FOBT)/Fecal Immunochemical Test (FIT)  * Fecal DNA/Cologuard Test  * Flexible Sigmoidoscopy Age: 39-70 years old   Colonoscopy: every 10 years (may be performed more frequently if at higher risk) OR  FOBT/FIT: every 1 year  OR  Cologuard: every 3 years  OR  Sigmoidoscopy: every 5 years  Screening may be recommended earlier than age 39 if at higher risk for colorectal cancer  Also, an individualized decision between you and your healthcare provider will decide whether screening between the ages of 74-80 would be appropriate  Colonoscopy: Not on file  FOBT/FIT: Not on file  Cologuard: Not on file  Sigmoidoscopy: Not on file          Breast Cancer Screening Age: 36 years old  Frequency: every 1-2 years  Not required if history of left and right mastectomy Mammogram: Not on file        Cervical Cancer Screening Between the ages of 21-29, pap smear recommended once every 3 years  Between the ages of 33-67, can perform pap smear with HPV co-testing every 5 years  Recommendations may differ for women with a history of total hysterectomy, cervical cancer, or abnormal pap smears in past  Pap Smear: Not on file        Hepatitis C Screening Once for adults born between 1945 and 1965  More frequently in patients at high risk for Hepatitis C Hep C Antibody: Not on file    Screening Current   Diabetes Screening 1-2 times per year if you're at risk for diabetes or have pre-diabetes Fasting glucose: No results in last 5 years (No results in last 5 years)  A1C: No results in last 5 years (No results in last 5 years)      Cholesterol Screening Once every 5 years if you don't have a lipid disorder  May order more often based on risk factors  Lipid panel: Not on file          Other Preventive Screenings Covered by Medicare:  1  Abdominal Aortic Aneurysm (AAA) Screening: covered once if your at risk  You're considered to be at risk if you have a family history of AAA    2  Lung Cancer Screening: covers low dose CT scan once per year if you meet all of the following conditions: (1) Age 50-69; (2) No signs or symptoms of lung cancer; (3) Current smoker or have quit smoking within the last 15 years; (4) You have a tobacco smoking history of at least 20 pack years (packs per day multiplied by number of years you smoked); (5) You get a written order from a healthcare provider  3  Glaucoma Screening: covered annually if you're considered high risk: (1) You have diabetes OR (2) Family history of glaucoma OR (3)  aged 48 and older OR (3)  American aged 72 and older  3  Osteoporosis Screening: covered every 2 years if you meet one of the following conditions: (1) You're estrogen deficient and at risk for osteoporosis based off medical history and other findings; (2) Have a vertebral abnormality; (3) On glucocorticoid therapy for more than 3 months; (4) Have primary hyperparathyroidism; (5) On osteoporosis medications and need to assess response to drug therapy  · Last bone density test (DXA Scan): Not on file  5  HIV Screening: covered annually if you're between the age of 12-76  Also covered annually if you are younger than 13 and older than 72 with risk factors for HIV infection  For pregnant patients, it is covered up to 3 times per pregnancy  Immunizations:  Immunization Recommendations   Influenza Vaccine Annual influenza vaccination during flu season is recommended for all persons aged >= 6 months who do not have contraindications   Pneumococcal Vaccine   * Pneumococcal conjugate vaccine = PCV13 (Prevnar 13), PCV15 (Vaxneuvance), PCV20 (Prevnar 20)  * Pneumococcal polysaccharide vaccine = PPSV23 (Pneumovax) Adults 25-60 years old: 1-3 doses may be recommended based on certain risk factors  Adults 72 years old: 1-2 doses may be recommended based off what pneumonia vaccine you previously received   Hepatitis B Vaccine 3 dose series if at intermediate or high risk (ex: diabetes, end stage renal disease, liver disease)   Tetanus (Td) Vaccine - COST NOT COVERED BY MEDICARE PART B Following completion of primary series, a booster dose should be given every 10 years to maintain immunity against tetanus   Td may also be given as tetanus wound prophylaxis  Tdap Vaccine - COST NOT COVERED BY MEDICARE PART B Recommended at least once for all adults  For pregnant patients, recommended with each pregnancy  Shingles Vaccine (Shingrix) - COST NOT COVERED BY MEDICARE PART B  2 shot series recommended in those aged 48 and above     Health Maintenance Due:      Topic Date Due   • HIV Screening  Never done   • Cervical Cancer Screening  03/02/2023 (Originally 11/3/1980)   • Colorectal Cancer Screening  02/02/2024 (Originally 11/3/2004)   • Breast Cancer Screening: Mammogram  02/02/2024 (Originally 11/3/1999)   • Hepatitis C Screening  Completed     Immunizations Due:      Topic Date Due   • Pneumococcal Vaccine: Pediatrics (0 to 5 Years) and At-Risk Patients (6 to 59 Years) (1 - PCV) Never done   • COVID-19 Vaccine (3 - Booster for Pfizer series) 07/05/2021   • Influenza Vaccine (1) Never done     Advance Directives   What are advance directives? Advance directives are legal documents that state your wishes and plans for medical care  These plans are made ahead of time in case you lose your ability to make decisions for yourself  Advance directives can apply to any medical decision, such as the treatments you want, and if you want to donate organs  What are the types of advance directives? There are many types of advance directives, and each state has rules about how to use them  You may choose a combination of any of the following:  · Living will: This is a written record of the treatment you want  You can also choose which treatments you do not want, which to limit, and which to stop at a certain time  This includes surgery, medicine, IV fluid, and tube feedings  · Durable power of  for healthcare [facility]): This is a written record that states who you want to make healthcare choices for you when you are unable to make them for yourself  This person, called a proxy, is usually a family member or a friend   You may choose more than 1 proxy  · Do not resuscitate (DNR) order:  A DNR order is used in case your heart stops beating or you stop breathing  It is a request not to have certain forms of treatment, such as CPR  A DNR order may be included in other types of advance directives  · Medical directive: This covers the care that you want if you are in a coma, near death, or unable to make decisions for yourself  You can list the treatments you want for each condition  Treatment may include pain medicine, surgery, blood transfusions, dialysis, IV or tube feedings, and a ventilator (breathing machine)  · Values history: This document has questions about your views, beliefs, and how you feel and think about life  This information can help others choose the care that you would choose  Why are advance directives important? An advance directive helps you control your care  Although spoken wishes may be used, it is better to have your wishes written down  Spoken wishes can be misunderstood, or not followed  Treatments may be given even if you do not want them  An advance directive may make it easier for your family to make difficult choices about your care  Cigarette Smoking and Your Health   Risks to your health if you smoke:  Nicotine and other chemicals found in tobacco damage every cell in your body  Even if you are a light smoker, you have an increased risk for cancer, heart disease, and lung disease  If you are pregnant or have diabetes, smoking increases your risk for complications  Benefits to your health if you stop smoking:   · You decrease respiratory symptoms such as coughing, wheezing, and shortness of breath  · You reduce your risk for cancers of the lung, mouth, throat, kidney, bladder, pancreas, stomach, and cervix  If you already have cancer, you increase the benefits of chemotherapy  You also reduce your risk for cancer returning or a second cancer from developing     · You reduce your risk for heart disease, blood clots, heart attack, and stroke  · You reduce your risk for lung infections, and diseases such as pneumonia, asthma, chronic bronchitis, and emphysema  · Your circulation improves  More oxygen can be delivered to your body  If you have diabetes, you lower your risk for complications, such as kidney, artery, and eye diseases  You also lower your risk for nerve damage  Nerve damage can lead to amputations, poor vision, and blindness  · You improve your body's ability to heal and to fight infections  For more information and support to stop smoking:   · [employer]  Phone: 9- 375 - [phone]  Web Address: [employer]  How to Quit Using Smokeless Tobacco   Why it is important to stop using smokeless tobacco:  Smokeless tobacco comes in many forms  Examples include chew, snuff, dip, dissolvable tobacco, and snus  All smokeless tobacco products contain nicotine and may contain as much nicotine as 3 cigarettes  You may be physically dependent on nicotine  You may also be emotionally addicted to it  The cravings can be strong, but it is important to quit using smokeless tobacco  You will improve your health and decrease your cancer, stroke, and heart attack risk  Mouth sores and tooth problems will also improve when you quit  You can benefit from quitting no matter how long you have used smokeless tobacco    Prepare to stop using smokeless tobacco:  Nicotine is a highly addictive drug  Withdrawal symptoms can happen when you stop and make it hard to quit  The following can help keep you on track:  · Set a quit date  · Tell friends, family, and coworkers that you plan to quit  · Remove all smokeless tobacco products from your home, car, and workplace  Manage weight gain after you quit:  Nicotine can affect your metabolism  You may gain a few pounds after you quit  The following can help you control your weight:  · Eat healthy foods  · Drink water before, during, and between meals  · Exercise as directed  © Copyright [employer] 2018 Information is for End User's use only and may not be sold, redistributed or otherwise used for commercial purposes  All illustrations and images included in CareNotes® are the copyrighted property of [employer] , [employer]  or [facility] Preventive Visit Patient Instructions  Thank you for completing your Welcome to Medicare Visit or Medicare Annual Wellness Visit today  Your next wellness visit will be due in one year (2/3/2024)  The screening/preventive services that you may require over the next 5-10 years are detailed below  Some tests may not apply to you based off risk factors and/or age  Screening tests ordered at today's visit but not completed yet may show as past due  Also, please note that scanned in results may not display below  Preventive Screenings:  Service Recommendations Previous Testing/Comments   Colorectal Cancer Screening  * Colonoscopy    * Fecal Occult Blood Test (FOBT)/Fecal Immunochemical Test (FIT)  * Fecal DNA/Cologuard Test  * Flexible Sigmoidoscopy Age: 39-70 years old   Colonoscopy: every 10 years (may be performed more frequently if at higher risk)  OR  FOBT/FIT: every 1 year  OR  Cologuard: every 3 years  OR  Sigmoidoscopy: every 5 years  Screening may be recommended earlier than age 39 if at higher risk for colorectal cancer  Also, an individualized decision between you and your healthcare provider will decide whether screening between the ages of 74-80 would be appropriate  Colonoscopy: Not on file  FOBT/FIT: Not on file  Cologuard: Not on file  Sigmoidoscopy: Not on file          Breast Cancer Screening Age: 36 years old  Frequency: every 1-2 years  Not required if history of left and right mastectomy Mammogram: Not on file        Cervical Cancer Screening Between the ages of 21-29, pap smear recommended once every 3 years     Between the ages of 33-67, can perform pap smear with HPV co-testing every 5 years  Recommendations may differ for women with a history of total hysterectomy, cervical cancer, or abnormal pap smears in past  Pap Smear: Not on file        Hepatitis C Screening Once for adults born between 1945 and 1965  More frequently in patients at high risk for Hepatitis C Hep C Antibody: Not on file    Screening Current   Diabetes Screening 1-2 times per year if you're at risk for diabetes or have pre-diabetes Fasting glucose: No results in last 5 years (No results in last 5 years)  A1C: No results in last 5 years (No results in last 5 years)      Cholesterol Screening Once every 5 years if you don't have a lipid disorder  May order more often based on risk factors  Lipid panel: Not on file          Other Preventive Screenings Covered by Medicare:  6  Abdominal Aortic Aneurysm (AAA) Screening: covered once if your at risk  You're considered to be at risk if you have a family history of AAA  7  Lung Cancer Screening: covers low dose CT scan once per year if you meet all of the following conditions: (1) Age 50-69; (2) No signs or symptoms of lung cancer; (3) Current smoker or have quit smoking within the last 15 years; (4) You have a tobacco smoking history of at least 20 pack years (packs per day multiplied by number of years you smoked); (5) You get a written order from a healthcare provider  8  Glaucoma Screening: covered annually if you're considered high risk: (1) You have diabetes OR (2) Family history of glaucoma OR (3)  aged 48 and older OR (3)  American aged 72 and older  5  Osteoporosis Screening: covered every 2 years if you meet one of the following conditions: (1) You're estrogen deficient and at risk for osteoporosis based off medical history and other findings; (2) Have a vertebral abnormality; (3) On glucocorticoid therapy for more than 3 months; (4) Have primary hyperparathyroidism; (5) On osteoporosis medications and need to assess response to drug therapy  · Last bone density test (DXA Scan): Not on file  10  HIV Screening: covered annually if you're between the age of 12-76  Also covered annually if you are younger than 13 and older than 72 with risk factors for HIV infection  For pregnant patients, it is covered up to 3 times per pregnancy  Immunizations:  Immunization Recommendations   Influenza Vaccine Annual influenza vaccination during flu season is recommended for all persons aged >= 6 months who do not have contraindications   Pneumococcal Vaccine   * Pneumococcal conjugate vaccine = PCV13 (Prevnar 13), PCV15 (Vaxneuvance), PCV20 (Prevnar 20)  * Pneumococcal polysaccharide vaccine = PPSV23 (Pneumovax) Adults 25-60 years old: 1-3 doses may be recommended based on certain risk factors  Adults 72 years old: 1-2 doses may be recommended based off what pneumonia vaccine you previously received   Hepatitis B Vaccine 3 dose series if at intermediate or high risk (ex: diabetes, end stage renal disease, liver disease)   Tetanus (Td) Vaccine - COST NOT COVERED BY MEDICARE PART B Following completion of primary series, a booster dose should be given every 10 years to maintain immunity against tetanus  Td may also be given as tetanus wound prophylaxis  Tdap Vaccine - COST NOT COVERED BY MEDICARE PART B Recommended at least once for all adults  For pregnant patients, recommended with each pregnancy     Shingles Vaccine (Shingrix) - COST NOT COVERED BY MEDICARE PART B  2 shot series recommended in those aged 48 and above     Health Maintenance Due:      Topic Date Due   • HIV Screening  Never done   • Cervical Cancer Screening  03/02/2023 (Originally 11/3/1980)   • Colorectal Cancer Screening  02/02/2024 (Originally 11/3/2004)   • Breast Cancer Screening: Mammogram  02/02/2024 (Originally 11/3/1999)   • Hepatitis C Screening  Completed     Immunizations Due:      Topic Date Due   • Pneumococcal Vaccine: Pediatrics (0 to 5 Years) and At-Risk Patients (6 to 59 Years) (1 - PCV) Never done   • COVID-19 Vaccine (3 - Booster for Pfizer series) 07/05/2021   • Influenza Vaccine (1) Never done     Advance Directives   What are advance directives? Advance directives are legal documents that state your wishes and plans for medical care  These plans are made ahead of time in case you lose your ability to make decisions for yourself  Advance directives can apply to any medical decision, such as the treatments you want, and if you want to donate organs  What are the types of advance directives? There are many types of advance directives, and each state has rules about how to use them  You may choose a combination of any of the following:  · Living will: This is a written record of the treatment you want  You can also choose which treatments you do not want, which to limit, and which to stop at a certain time  This includes surgery, medicine, IV fluid, and tube feedings  · Durable power of  for healthcare [facility]): This is a written record that states who you want to make healthcare choices for you when you are unable to make them for yourself  This person, called a proxy, is usually a family member or a friend  You may choose more than 1 proxy  · Do not resuscitate (DNR) order:  A DNR order is used in case your heart stops beating or you stop breathing  It is a request not to have certain forms of treatment, such as CPR  A DNR order may be included in other types of advance directives  · Medical directive: This covers the care that you want if you are in a coma, near death, or unable to make decisions for yourself  You can list the treatments you want for each condition  Treatment may include pain medicine, surgery, blood transfusions, dialysis, IV or tube feedings, and a ventilator (breathing machine)  · Values history: This document has questions about your views, beliefs, and how you feel and think about life   This information can help others choose the care that you would choose  Why are advance directives important? An advance directive helps you control your care  Although spoken wishes may be used, it is better to have your wishes written down  Spoken wishes can be misunderstood, or not followed  Treatments may be given even if you do not want them  An advance directive may make it easier for your family to make difficult choices about your care  Cigarette Smoking and Your Health   Risks to your health if you smoke:  Nicotine and other chemicals found in tobacco damage every cell in your body  Even if you are a light smoker, you have an increased risk for cancer, heart disease, and lung disease  If you are pregnant or have diabetes, smoking increases your risk for complications  Benefits to your health if you stop smoking:   · You decrease respiratory symptoms such as coughing, wheezing, and shortness of breath  · You reduce your risk for cancers of the lung, mouth, throat, kidney, bladder, pancreas, stomach, and cervix  If you already have cancer, you increase the benefits of chemotherapy  You also reduce your risk for cancer returning or a second cancer from developing  · You reduce your risk for heart disease, blood clots, heart attack, and stroke  · You reduce your risk for lung infections, and diseases such as pneumonia, asthma, chronic bronchitis, and emphysema  · Your circulation improves  More oxygen can be delivered to your body  If you have diabetes, you lower your risk for complications, such as kidney, artery, and eye diseases  You also lower your risk for nerve damage  Nerve damage can lead to amputations, poor vision, and blindness  · You improve your body's ability to heal and to fight infections  For more information and support to stop smoking:   · [employer]  Phone: 5- 163 - 085-7741  Web Address: [employer]  How to Quit Using Smokeless Tobacco   Why it is important to stop using smokeless tobacco:  Smokeless tobacco comes in many forms  Examples include chew, snuff, dip, dissolvable tobacco, and snus  All smokeless tobacco products contain nicotine and may contain as much nicotine as 3 cigarettes  You may be physically dependent on nicotine  You may also be emotionally addicted to it  The cravings can be strong, but it is important to quit using smokeless tobacco  You will improve your health and decrease your cancer, stroke, and heart attack risk  Mouth sores and tooth problems will also improve when you quit  You can benefit from quitting no matter how long you have used smokeless tobacco    Prepare to stop using smokeless tobacco:  Nicotine is a highly addictive drug  Withdrawal symptoms can happen when you stop and make it hard to quit  The following can help keep you on track:  · Set a quit date  · Tell friends, family, and coworkers that you plan to quit  · Remove all smokeless tobacco products from your home, car, and workplace  Manage weight gain after you quit:  Nicotine can affect your metabolism  You may gain a few pounds after you quit  The following can help you control your weight:  · Eat healthy foods  · Drink water before, during, and between meals  · Exercise as directed  © Copyright [employer] 2018 Information is for End User's use only and may not be sold, redistributed or otherwise used for commercial purposes   All illustrations and images included in CareNotes® are the copyrighted property of A D A M , Inc  or [address] [place] [address]

## 2023-02-02 NOTE — ASSESSMENT & PLAN NOTE
LUDIVINA-7 Score 15   PHQ9 Score 12  Emotional support provided  · Denies suicidal ideations or thoughts  Current medications: Zoloft 100mg  · New medication regimen: Zoloft 100mg, Buspar 5mg, Hydroxyzine 10mg PRN   Discussed lifestyle modifications and relaxation methods   Referral to behavioral health- Patient aware of extended wait times   Follow up in one month

## 2023-02-02 NOTE — ASSESSMENT & PLAN NOTE
Alcohol screen (+) AUDIT-C score 4  Admits to drinking one glass of liliane a night   Discussed risk of drinking and educated patient on resources to help decrease the amount consumed

## 2023-02-02 NOTE — ASSESSMENT & PLAN NOTE
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

## 2023-02-03 ENCOUNTER — TELEPHONE (OUTPATIENT)
Dept: FAMILY MEDICINE CLINIC | Facility: CLINIC | Age: 63
End: 2023-02-03

## 2023-02-03 DIAGNOSIS — F41.9 ANXIETY: ICD-10-CM

## 2023-02-03 DIAGNOSIS — R00.2 PALPITATION: ICD-10-CM

## 2023-02-03 RX ORDER — SERTRALINE HYDROCHLORIDE 100 MG/1
100 TABLET, FILM COATED ORAL DAILY
Qty: 90 TABLET | Refills: 3 | Status: SHIPPED | OUTPATIENT
Start: 2023-02-03

## 2023-02-03 RX ORDER — PROPRANOLOL HYDROCHLORIDE 40 MG/1
40 TABLET ORAL 2 TIMES DAILY
Qty: 90 TABLET | Refills: 3 | Status: SHIPPED | OUTPATIENT
Start: 2023-02-03 | End: 2023-08-28

## 2023-02-03 NOTE — TELEPHONE ENCOUNTER
Seen yesterday  Given buspar and hydroxyzine   Is she to continue  Propranolol and sertraline?  If so she needs refills   Please let her know [phone]

## 2023-02-03 NOTE — TELEPHONE ENCOUNTER
Orders for medications sent to pharmacy   Please continue sertraline and propanolol in addition to hydroxyzine and buspar

## 2023-02-23 ENCOUNTER — RA CDI HCC (OUTPATIENT)
Dept: OTHER | Facility: HOSPITAL | Age: 63
End: 2023-02-23

## 2023-03-01 NOTE — ASSESSMENT & PLAN NOTE
PHQ9 Score 4  · Previous PHQ-9 score 12  Emotional support provided  · Denies suicidal ideations or thoughts  Current medications: Zoloft 100mg, Buspar 5mg, Hydroxyzine 10mg PRN   · Patient feels much improved on current medication regimen, plan to continue  Discussed lifestyle modifications and relaxation methods   Referral to behavioral health  · Patient aware of extended wait times

## 2023-03-01 NOTE — PROGRESS NOTES
Name: Sergey Leal      : 1960      MRN: [id number]  Encounter Provider: SUSANA Moore  Encounter Date: 3/2/2023   Encounter department: [address] [place] [address]  Anxiety and depression  Assessment & Plan:  PHQ9 Score 4  · Previous PHQ-9 score 12  Emotional support provided  · Denies suicidal ideations or thoughts  Current medications: Zoloft 100mg, Buspar 5mg, Hydroxyzine 10mg PRN   · Patient feels much improved on current medication regimen, plan to continue  Discussed lifestyle modifications and relaxation methods   Referral to behavioral health  · Patient aware of extended wait times         2  Hip pain  Assessment & Plan:  Bilateral hip pain   · Was stepping over the baby gate and misplaced footing and fell onto right hip approximately 5 weeks ago  · Denies bruising discoloration numbness or tingling to extremity  · Has been having difficulty with left hip for over one year  Xray bilateral hips ordered  Referral to comprehensive spine as the pain starts as sciatic pain refers down  Methocarbamol ordered     Orders:  -     XR hips bilateral 3-4 vw w pelvis if performed; Future; Expected date: 2023  -     Ambulatory Referral to Comprehensive Spine Program; Future  -     methocarbamol (ROBAXIN) 500 mg tablet; Take 1 tablet (500 mg total) by mouth 3 (three) times a day for 14 days    3  Bilateral impacted cerumen  Comments:  Bilateral cerumen impaction irrigated with instrumentation  Tolerated procedure well         Subjective      One month follow up for anxiety and depression   Bilateral hip pain   Ear lavage     Hip Pain   The incident occurred more than 1 week ago  The incident occurred at home  The injury mechanism was a fall  The pain is present in the right hip and left hip  The quality of the pain is described as aching  The pain is at a severity of 6/10  The pain is moderate  The pain has been fluctuating since onset   Associated symptoms include muscle weakness  Pertinent negatives include no inability to bear weight, loss of motion, loss of sensation, numbness or tingling  She reports no foreign bodies present  The symptoms are aggravated by weight bearing and movement  She has tried acetaminophen, NSAIDs and rest for the symptoms  The treatment provided mild relief  Anxiety  Presents for follow-up visit  Symptoms include depressed mood and excessive worry  Patient reports no chest pain, compulsions, confusion, decreased concentration, dizziness, feeling of choking, insomnia, malaise, nausea, nervous/anxious behavior, obsessions, palpitations, panic, restlessness, shortness of breath or suicidal ideas  Symptoms occur occasionally  The severity of symptoms is mild  The quality of sleep is fair  Nighttime awakenings: occasional      Compliance with medications is %  Review of Systems   Constitutional: Negative for activity change, chills, fatigue and fever  HENT: Negative for congestion, ear pain, rhinorrhea, sore throat and trouble swallowing  Eyes: Negative for pain and visual disturbance  Respiratory: Negative for cough, chest tightness and shortness of breath  Cardiovascular: Negative for chest pain, palpitations and leg swelling  Gastrointestinal: Negative for abdominal pain, constipation, diarrhea, nausea and vomiting  Genitourinary: Negative for difficulty urinating, dysuria, hematuria and urgency  Musculoskeletal: Negative for arthralgias and back pain  Skin: Negative for color change and rash  Neurological: Negative for dizziness, tingling, seizures, syncope, numbness and headaches  Psychiatric/Behavioral: Negative for confusion, decreased concentration, dysphoric mood and suicidal ideas  The patient is not nervous/anxious and does not have insomnia  All other systems reviewed and are negative        Current Outpatient Medications on File Prior to Visit   Medication Sig   • aspirin (ECOTRIN LOW STRENGTH) 81 mg EC tablet Take 81 mg by mouth   • busPIRone (BUSPAR) 5 mg tablet Take 1 tablet (5 mg total) by mouth 2 (two) times a day   • cilostazol (PLETAL) 50 mg tablet Take 100 mg by mouth 2 (two) times a day   • hydrOXYzine HCL (ATARAX) 10 mg tablet Take 1 tablet (10 mg total) by mouth every 6 (six) hours as needed for anxiety   • propranolol (INDERAL) 40 mg tablet Take 1 tablet (40 mg total) by mouth 2 (two) times a day   • sertraline (ZOLOFT) 100 mg tablet Take 1 tablet (100 mg total) by mouth daily   • atorvastatin (LIPITOR) 40 mg tablet Take 40 mg by mouth daily       Objective     /70 (BP Location: Left arm, Patient Position: Sitting, Cuff Size: Adult)   Pulse 80   Temp (!) 96 3 °F (35 7 °C) (Temporal)   Wt 63 3 kg (139 lb 9 6 oz)   SpO2 100%   BMI 21 86 kg/m²     Physical Exam  Vitals and nursing note reviewed  Constitutional:       Appearance: Normal appearance  She is normal weight  HENT:      Head: Normocephalic  Right Ear: Tympanic membrane, ear canal and external ear normal  There is impacted cerumen  Left Ear: Tympanic membrane, ear canal and external ear normal  There is impacted cerumen  Nose: Nose normal       Mouth/Throat:      Mouth: Mucous membranes are moist       Pharynx: Oropharynx is clear  Eyes:      Extraocular Movements: Extraocular movements intact  Conjunctiva/sclera: Conjunctivae normal       Pupils: Pupils are equal, round, and reactive to light  Cardiovascular:      Rate and Rhythm: Normal rate and regular rhythm  Pulses: Normal pulses  Heart sounds: Normal heart sounds  Pulmonary:      Effort: Pulmonary effort is normal       Breath sounds: Normal breath sounds  Abdominal:      General: Bowel sounds are normal  There is no distension  Palpations: Abdomen is soft  Tenderness: There is no abdominal tenderness  Musculoskeletal:      Cervical back: Normal range of motion        Lumbar back: No swelling, edema, deformity or signs of trauma  Decreased range of motion  Positive right straight leg raise test and positive left straight leg raise test       Right hip: No tenderness  Decreased range of motion  Normal strength  Left hip: No tenderness  Decreased range of motion  Normal strength  Skin:     General: Skin is warm  Capillary Refill: Capillary refill takes less than 2 seconds  Neurological:      General: No focal deficit present  Mental Status: She is alert and oriented to person, place, and time  Mental status is at baseline  Psychiatric:         Mood and Affect: Mood normal          Behavior: Behavior normal          Thought Content: Thought content normal          Judgment: Judgment normal      Ear cerumen removal    Date/Time: 3/2/2023 9:56 AM  Performed by: SUSANA Cortes  Authorized by: SUSANA Cortes   Universal Protocol:  Consent: Verbal consent obtained  Risks and benefits: risks, benefits and alternatives were discussed    Patient location:  Clinic  Procedure details:     Local anesthetic:  None    Location:  L ear and R ear    Procedure type: irrigation with instrumentation      Instrumentation: curette      Approach:  External  Post-procedure details:     Complication:  None    Hearing quality:  Improved    Patient tolerance of procedure:   Tolerated well, no immediate complications        SUSANA Cortes

## 2023-03-02 ENCOUNTER — OFFICE VISIT (OUTPATIENT)
Dept: FAMILY MEDICINE CLINIC | Facility: CLINIC | Age: 63
End: 2023-03-02

## 2023-03-02 VITALS
SYSTOLIC BLOOD PRESSURE: 110 MMHG | OXYGEN SATURATION: 100 % | DIASTOLIC BLOOD PRESSURE: 70 MMHG | WEIGHT: 139.6 LBS | TEMPERATURE: 96.3 F | HEART RATE: 80 BPM | BODY MASS INDEX: 21.86 KG/M2

## 2023-03-02 DIAGNOSIS — F32.A ANXIETY AND DEPRESSION: Primary | ICD-10-CM

## 2023-03-02 DIAGNOSIS — F41.9 ANXIETY AND DEPRESSION: Primary | ICD-10-CM

## 2023-03-02 DIAGNOSIS — H61.23 BILATERAL IMPACTED CERUMEN: ICD-10-CM

## 2023-03-02 DIAGNOSIS — M25.559 HIP PAIN: ICD-10-CM

## 2023-03-02 DIAGNOSIS — M25.559 HIP PAIN: Primary | ICD-10-CM

## 2023-03-02 RX ORDER — METHOCARBAMOL 500 MG/1
500 TABLET, FILM COATED ORAL 3 TIMES DAILY
Qty: 42 TABLET | Refills: 0 | Status: SHIPPED | OUTPATIENT
Start: 2023-03-02 | End: 2023-03-16

## 2023-03-02 NOTE — PATIENT INSTRUCTIONS
Hip Bursitis Exercises   AMBULATORY CARE:   Hip bursitis exercises  help strengthen the muscles in your hip and keep the joint flexible  Strong muscles can help reduce pain, prevent injury, and keep the joint stable  The exercises can also help increase the range of motion in your hip joint  Call your doctor or physical therapist if:   You have sharp pain during exercise or at rest     You have questions or concerns about the stretches or exercises  What you need to know about exercise safety:   Move slowly and smoothly  Avoid fast or jerky motions  This will help prevent an injury  Breathe normally  Do not hold your breath  It is important to breathe in and out so you do not tense up during exercise  Tension could prevent you from moving your joint in a full range of motion  Do the exercises and stretches on both legs  Do this so both hips remain strong and flexible  Stop if you feel sharp pain or an increase in pain  Contact your healthcare provider or physical therapist  It is normal to feel some discomfort during exercise  Regular exercise will help decrease your discomfort over time  Warm up and cool down when you exercise  Walk or ride a stationary bike for 5 to 10 minutes before you start  This warms and relaxes your muscles to help prevent an injury  When you have finished the exercises, cool down by walking in place for a few minutes  You may also need to stretch as part of your warm up or cool down routine  Your provider or physical therapist will tell you which stretches to do  How to do hip stretches: Your healthcare provider or physical therapist will tell you how many times to do each stretch  Do the stretch on both sides before you move to the next stretch  Standing iliotibial band stretch:  Stand with the leg on your injured side behind your other leg  Bend sideways toward the side that is not injured  Stop when you feel a stretch in your outer hip   Hold for 5 to 10 seconds  Then return to the starting position  Lying iliotibial band stretch:  Lie on your back  Bend the knee on your injured side toward your chest  Place your hands on the outside of your knee and thigh  Slowly pull the knee across your body  Stop when you feel a stretch in your hip and outer thigh  Hold for 5 to 10 seconds  Return your leg to the starting position  Hip stretch:  Lie on your back with both legs straight and on the ground  Bend the knee on your injured side toward your chest until you can reach your lower leg  Place both hands on your shin and pull your knee toward your chest  Hold for 5 to 10 seconds  Return your leg to the starting position  Knee to chest:  Lie on your back with both knees bent and feet flat on the floor  Bend the knee on your injured side toward your chest until you can reach your lower leg  Place both hands on your shin and pull your knee toward your chest  Hold for 5 to 10 seconds  Return your leg to the starting position  Internal hip rotator stretch:  You will do this exercise on a table  Lie on your side with the injured hip on top  You may be told to keep a pillow between your thighs  Move the top leg so the foot hangs below the edge of the table  Rotate your hip to raise your foot in the opposite direction of the bottom shoulder  Raise your foot as high as you can so you feel a stretch in the back of your thigh  Hold for 5 seconds  Then slowly lower your foot to the starting position  External hip rotator stretch:  You will do this exercise on a table  Lie on your side with the injured hip on the bottom  You do not need a pillow between your thighs for this exercise  Move the bottom leg so the foot is off the edge of the table  Rotate your hip to lift the foot in the opposite direction of the bottom shoulder  Raise your foot as high as you can so you feel a stretch in your buttock  Hold for 5 seconds   Then slowly lower your foot to the starting position  Kneeling hip flexor stretch:  Kneel on your knee on the injured side  Place the foot of your other leg on the floor so the knee is bent  Put both hands on top of your thigh  Keep your back straight and abdominal muscles tight  Lean forward until you feel a stretch in your other thigh  Hold the stretch for 10 seconds  Return to the starting position  How to do hip strengthening exercises: Your healthcare provider or physical therapist will tell you how many times to do each exercise  Do the exercise on both sides before you move to the next exercise  Straight leg lift to the side: This may also be called hip abduction  Lie on your side with straight legs, with the injured hip on top  Slowly raise your top leg toward the ceiling as high as you can  Keep your foot pointed  Hold for 5 seconds  Then slowly lower your leg to the starting position  Inner thigh lift: This may also be called hip adduction  Lie on your side with straight legs, with the injured hip on the bottom  Cross your top leg over your bottom leg  Put the foot of your top leg on the floor in front of you  Raise your bottom leg until it touches the top leg  Hold for 5 seconds  Then slowly lower the leg to the floor  Clam exercise:  Lie on your side so your injured side is on top  Bend your knees  Keep your heels together during this exercise  Slowly raise your top knee toward the ceiling  Then lower your leg so your knees are together  Follow up with your doctor or physical therapist as directed:  Write down your questions so you remember to ask them during your visits  © Copyright [employer] 2022 Information is for End User's use only and may not be sold, redistributed or otherwise used for commercial purposes  The above information is an  only  It is not intended as medical advice for individual conditions or treatments   Talk to your doctor, nurse or pharmacist before following any medical regimen to see if it is safe and effective for you

## 2023-03-02 NOTE — ASSESSMENT & PLAN NOTE
Bilateral hip pain   · Was stepping over the baby gate and misplaced footing and fell onto right hip approximately 5 weeks ago      · Denies bruising discoloration numbness or tingling to extremity  · Has been having difficulty with left hip for over one year  Xray bilateral hips ordered  Referral to comprehensive spine as the pain starts as sciatic pain refers down  Methocarbamol ordered

## 2023-03-03 ENCOUNTER — HOSPITAL ENCOUNTER (OUTPATIENT)
Dept: RADIOLOGY | Facility: HOSPITAL | Age: 63
Discharge: HOME | End: 2023-03-03

## 2023-03-03 DIAGNOSIS — M25.559 HIP PAIN: ICD-10-CM

## 2023-03-06 ENCOUNTER — TELEPHONE (OUTPATIENT)
Dept: FAMILY MEDICINE CLINIC | Facility: CLINIC | Age: 63
End: 2023-03-06

## 2023-03-07 ENCOUNTER — TELEPHONE (OUTPATIENT)
Dept: FAMILY MEDICINE CLINIC | Facility: CLINIC | Age: 63
End: 2023-03-07

## 2023-03-21 ENCOUNTER — EVALUATION (OUTPATIENT)
Dept: PHYSICAL THERAPY | Facility: REHABILITATION | Age: 63
End: 2023-03-21

## 2023-03-21 DIAGNOSIS — G89.29 CHRONIC MIDLINE LOW BACK PAIN WITH LEFT-SIDED SCIATICA: Primary | ICD-10-CM

## 2023-03-21 DIAGNOSIS — M25.559 HIP PAIN: ICD-10-CM

## 2023-03-21 DIAGNOSIS — M54.42 CHRONIC MIDLINE LOW BACK PAIN WITH LEFT-SIDED SCIATICA: Primary | ICD-10-CM

## 2023-03-21 NOTE — PROGRESS NOTES
PT Evaluation     Today's date: 3/23/2023  Patient name: Rashida Chandler  : 1960  MRN: [id number]  Referring provider: Regina Torres, PT  Dx:   Encounter Diagnosis     ICD-10-CM    1  Chronic midline low back pain with left-sided sciatica  M54 42     G89 29       2  Hip pain  M25 559 Ambulatory referral to PT spine                     Assessment  Assessment details: Rashida Chandler is a 60 y o  female presenting with chronic (B) hip pain and lumbosacral pain    Primary impairments include decreased ROM, weakness, hyperalgesia, mtor control defcits  Will benefit from skilled PT interventions for improved community navigation and return to [facility]  HEP was provided and reviewed  Patient is able to complete HEP with good technique and appropriate pain response  Patient expressed understanding of appropriate dosage and frequency of HEP  No additional referral necessary  Impairments: abnormal coordination, abnormal muscle firing, abnormal or restricted ROM, abnormal movement, activity intolerance, impaired balance, impaired physical strength, lacks appropriate home exercise program, pain with function, poor posture  and poor body mechanics  Functional limitations: walking, standing, lifting, carrying, pushing, pulling,   Symptom irritability: moderateUnderstanding of Dx/Px/POC: good   Prognosis: good    Goals    Short Term Goals: In 4 weeks, the patient will:  1  Lumbar flexion to >60  2  Hip/core mmt to >3+/5  3  Supervision with HEP for self care    Long Term Goals: In 8 weeks, the patient will:  1  Walking/standing tolerance to >1 hour  2  FOTO to greater than predicted value  3   Independent with HEP for selfcare      Plan  Patient would benefit from: skilled physical therapy  Planned therapy interventions: joint mobilization, manual therapy, massage, [place] taping, neuromuscular re-education, patient education, postural training, self care, strengthening, stretching, therapeutic activities, therapeutic exercise, therapeutic training, graded exercise, graded activity, home exercise program, flexibility, functional ROM exercises, balance and activity modification  Frequency: 2x week  Duration in weeks: 8  Treatment plan discussed with: patient        Subjective  Pain Location: Lumbar and L hip pain and parethesia, fall with R hip contusion around 2 months ago,   Pain Intensity: (B) hip pain 7/10   ZEFERINO: Chronic  Provoked by: prolonged positioning  Eased Positions: changing positions, meds  Constitutional S/S: denies   Goals: improve mobility, activity tolerance, stairs, walking, house chores, lifting    Objective    Vitals: 132/76    Red Flags:denies,     Postural Findings:     Head Position x Protracted  Neutral  Retracted   Scapular Position x Protracted  Neutral  Retracted   Thoracic Spine x Inc Kyphosis  Neutral     Lumbar Spine  Inc Lordosis  Neutral x Dec Lordosis   Pelvis  Anterior Tilt  Neutral x Posterior Tilt   Lateral Shift  Right  Left x None     Strength and ROM evaluated B from a regional biomechanical perspective and values relevant to this episode recorded in tables below      ROM:   Joint / Motion  Right  Left    Lumbar Flexion  40*    Lumbar Extension  5*     Lumbar Sidebending  50% 50%    Lumbar rotation 50% 50%   Hip ER  WNl WNl   Hip IR  WNl WNL     Repeated Movements: Not indicated    NEURO Screen  Reflexes  Right Left   Patellar (L3-L4) 2+ 2+   Achilles (S1-S2) 2+ 2+   Edwards's n n   Clonus n n     LE Myotomes:  Nerve root Test Action RIGHT  LEFT   L1-L2 Hip Flexion 5 5   L3 Knee Extension 5 5   L4  Ankle DF 5 5   L5 Great toe Extension 5 5   S1 Ankle PF, ankle eversion, hip extension, knee flexion 5   5     S2 Knee Flexion 5 5     Dermatomes: Intact, describes intermittent L L paresthesia, not reproducible    Additional Assessments:  Pain with palpation noted: (B) Greater troch  Joint Mobility: pain with Pas L5-S1,     Special Tests:  Lumbar Specific and Neural Tension Test / Measure  Right 3/21/2023 Left 3/21/2023   Straight Leg raise 35 35   Crossed straight leg raise n n   Slump test n n   Prone instability test p p     SI Joint Screen: no Presence of Inominate asymmetry, (P) March Test, TTP posterior SIJ ligaments    Treatment Based Classification: Primary stabilization Secondary CBT       Precautions: PAD, smoker, anxiety, chronic    Pertinent Findings:             POC End Date: 5/21/23                      Test / Measure  3/21/2023   FOTO Pred 50 35   Lumbar flexion 40   SLR 35 deg   Hip/core mmt 3-/5       Manuals 3/21                   Neuro Re-Ed                                Ther Ex    HEP review 8'                               Ther Activity            Gait Training            Modalities

## 2023-03-28 ENCOUNTER — OFFICE VISIT (OUTPATIENT)
Dept: PHYSICAL THERAPY | Facility: REHABILITATION | Age: 63
End: 2023-03-28

## 2023-03-28 DIAGNOSIS — G89.29 CHRONIC MIDLINE LOW BACK PAIN WITH LEFT-SIDED SCIATICA: ICD-10-CM

## 2023-03-28 DIAGNOSIS — M25.559 HIP PAIN: Primary | ICD-10-CM

## 2023-03-28 DIAGNOSIS — M54.42 CHRONIC MIDLINE LOW BACK PAIN WITH LEFT-SIDED SCIATICA: ICD-10-CM

## 2023-03-28 NOTE — PROGRESS NOTES
"Daily Note     Today's date: 3/28/2023  Patient name: Carlos Campos  : 1960  MRN: [id number]  Referring provider: Ramiro Feng, PT  Dx:   Encounter Diagnosis     ICD-10-CM    1  Hip pain  M25 559       2  Chronic midline low back pain with left-sided sciatica  M54 42     G89 29                      Subjective: Pt reports no changes upon arrival, states that she continues to have low back pain and L side sciatica, b/l hip pain      Objective: See treatment diary below      Assessment: Tolerated treatment well  Patient would benefit from continued PT  Pt did well with first treat, demonstrated preference for flexion based stretching and motion, noted some minor relief with PB stretches  Pt had some pain present with bridges  Pt may benefit from inclusion of rotational stretches next visit  Pt requires some additional time for exercises due to limitations with mobility  Pt  1:1 with PTA for entirety  Plan: Continue per plan of care  Precautions: PAD, smoker, anxiety, chronic    Pertinent Findings:             POC End Date: 23                      Test / Measure  3/21/2023   FOTO Pred 50 35   Lumbar flexion 40   SLR 35 deg   Hip/core mmt 3-/5       Manuals 3/21 3/28                       Neuro Re-Ed                                        Ther Ex     HEP review 8'    Nustep  8' L6   Standing hip ABD + Ext  2x10 ea b/l   Supine marches  10x ea b/l alt   TrA bracing  10x5\"   Bridges  5x p!              Ther Activity     3 way pb str  10x10\" ea        Gait Training               Modalities                    "

## 2023-03-29 ENCOUNTER — APPOINTMENT (OUTPATIENT)
Dept: PHYSICAL THERAPY | Facility: REHABILITATION | Age: 63
End: 2023-03-29

## 2023-03-30 ENCOUNTER — OFFICE VISIT (OUTPATIENT)
Dept: PHYSICAL THERAPY | Facility: REHABILITATION | Age: 63
End: 2023-03-30

## 2023-03-30 DIAGNOSIS — M25.559 HIP PAIN: Primary | ICD-10-CM

## 2023-03-30 DIAGNOSIS — M54.42 CHRONIC MIDLINE LOW BACK PAIN WITH LEFT-SIDED SCIATICA: ICD-10-CM

## 2023-03-30 DIAGNOSIS — G89.29 CHRONIC MIDLINE LOW BACK PAIN WITH LEFT-SIDED SCIATICA: ICD-10-CM

## 2023-03-30 NOTE — PROGRESS NOTES
"Daily Note     Today's date: 3/30/2023  Patient name: Sasha Villeda  : 1960  MRN: [id number]  Referring provider: Andi Orta PT  Dx:   Encounter Diagnosis     ICD-10-CM    1  Hip pain  M25 559       2  Chronic midline low back pain with left-sided sciatica  M54 42     G89 29                      Subjective: Pt reports her pain has been the same for 15 years so no changes since LV  Objective: See treatment diary below      Assessment: Tolerated treatment well  Patient demonstrated fatigue post treatment      Plan: Continue per plan of care        Precautions: PAD, smoker, anxiety, chronic    Pertinent Findings:             POC End Date: 23                      Test / Measure  3/21/2023   FOTO Pred 50 35   Lumbar flexion 40   SLR 35 deg   Hip/core mmt 3-/5       Manuals 3/21 3/28 3/30                           Neuro Re-Ed                                                Ther Ex      HEP review 8'     Nustep  8' L6 8; L6   Standing hip ABD + Ext  2x10 ea b/l    Supine marches  10x ea b/l alt 10x ea b/l alt   TrA bracing  10x5\" 5\" x10   Bridges  5x p! x10   TrA+hip add iso   5\"x10   clamshells   3\"x10 ea   Ther Activity      3 way pb str  10x10\" ea 10\"x10 ea         Gait Training                  Modalities                       "

## 2023-04-04 ENCOUNTER — OFFICE VISIT (OUTPATIENT)
Dept: PHYSICAL THERAPY | Facility: REHABILITATION | Age: 63
End: 2023-04-04

## 2023-04-04 DIAGNOSIS — M54.42 CHRONIC MIDLINE LOW BACK PAIN WITH LEFT-SIDED SCIATICA: Primary | ICD-10-CM

## 2023-04-04 DIAGNOSIS — M25.551 BILATERAL HIP PAIN: ICD-10-CM

## 2023-04-04 DIAGNOSIS — G89.29 CHRONIC MIDLINE LOW BACK PAIN WITH LEFT-SIDED SCIATICA: Primary | ICD-10-CM

## 2023-04-04 DIAGNOSIS — M25.552 BILATERAL HIP PAIN: ICD-10-CM

## 2023-04-04 NOTE — PROGRESS NOTES
"Daily Note     Today's date: 2023  Patient name: Colleen Angel  : 1960  MRN: [id number]  Referring provider: Briana Nelson, PT  Dx:   Encounter Diagnosis     ICD-10-CM    1  Chronic midline low back pain with left-sided sciatica  M54 42 Ambulatory Referral to Pain Management    G89 29       2  Bilateral hip pain  M25 551 Ambulatory Referral to Pain Management    M25 552           Start Time: 1115  Stop Time: 1145  Total time in clinic (min): 30 minutes    Subjective: Pt reports significant systemic pain  Reports taking many NSAIDs and has unrelenting pain  Objective: See treatment diary below      Assessment: Tolerated treatment poor  Patient would benefit from continued PT 1:1 with GRICELDA WrightT for entirety of tx  Pt very emotional regarding pain and impact on function today  Regressed tx intensity, spoke with pt and we will put in conultations with pain management to pursue additional symptom modulation options  Pt agreeable to this and referral put into system  Pt demonstrates significant hyperalgesia and allodynia with low intensity activity  Plan: Continue per plan of care        Precautions: PAD, smoker, anxiety, chronic    Pertinent Findings:             POC End Date: 23                      Test / Measure  3/21/2023   FOTO Pred 50 35   Lumbar flexion 40   SLR 35 deg   Hip/core mmt 3-/5       Manuals 3/21 3/28 3/30 4/4                               Neuro Re-Ed                                                        Ther Ex       HEP review 8'      Nustep  8' L6 8; L6    Standing hip ABD + Ext  2x10 ea b/l     Supine marches  10x ea b/l alt 10x ea b/l alt 10x ea b/l alt   TrA bracing  10x5\" 5\" x10 5\" x10   Bridges  5x p! x10    TrA+hip add iso   5\"x10 5\"x10          clamshells   3\"x10 ea 5\"x10 isometrics   LTRs    x20   Ther Activity       3 way pb str  10x10\" ea 10\"x10 ea           Gait Training                     Modalities                          "

## 2023-04-06 ENCOUNTER — APPOINTMENT (OUTPATIENT)
Dept: PHYSICAL THERAPY | Facility: REHABILITATION | Age: 63
End: 2023-04-06

## 2023-04-10 ENCOUNTER — APPOINTMENT (OUTPATIENT)
Dept: PHYSICAL THERAPY | Facility: REHABILITATION | Age: 63
End: 2023-04-10

## 2023-04-11 ENCOUNTER — APPOINTMENT (OUTPATIENT)
Dept: PHYSICAL THERAPY | Facility: REHABILITATION | Age: 63
End: 2023-04-11

## 2023-04-13 ENCOUNTER — APPOINTMENT (OUTPATIENT)
Dept: PHYSICAL THERAPY | Facility: REHABILITATION | Age: 63
End: 2023-04-13

## 2023-04-14 PROBLEM — G89.29 CHRONIC MIDLINE LOW BACK PAIN WITH LEFT-SIDED SCIATICA: Status: ACTIVE | Noted: 2023-04-14

## 2023-04-14 PROBLEM — M54.42 CHRONIC MIDLINE LOW BACK PAIN WITH LEFT-SIDED SCIATICA: Status: ACTIVE | Noted: 2023-04-14

## 2023-04-14 PROBLEM — M47.816 LUMBAR SPONDYLOSIS: Status: ACTIVE | Noted: 2023-04-14

## 2023-04-14 PROBLEM — M48.061 SPINAL STENOSIS OF LUMBAR REGION: Status: ACTIVE | Noted: 2023-04-14

## 2023-04-18 ENCOUNTER — APPOINTMENT (OUTPATIENT)
Dept: PHYSICAL THERAPY | Facility: REHABILITATION | Age: 63
End: 2023-04-18

## 2023-04-20 ENCOUNTER — APPOINTMENT (OUTPATIENT)
Dept: PHYSICAL THERAPY | Facility: REHABILITATION | Age: 63
End: 2023-04-20

## 2023-05-09 ENCOUNTER — HOSPITAL ENCOUNTER (OUTPATIENT)
Dept: RADIOLOGY | Facility: CLINIC | Age: 63
Discharge: HOME | End: 2023-05-09

## 2023-05-09 VITALS
OXYGEN SATURATION: 99 % | DIASTOLIC BLOOD PRESSURE: 95 MMHG | RESPIRATION RATE: 18 BRPM | SYSTOLIC BLOOD PRESSURE: 172 MMHG | HEART RATE: 64 BPM | TEMPERATURE: 97.5 F

## 2023-05-09 DIAGNOSIS — M54.16 LUMBAR RADICULOPATHY: ICD-10-CM

## 2023-05-09 RX ORDER — PAPAVERINE HCL 150 MG
15 CAPSULE, EXTENDED RELEASE ORAL ONCE
Status: COMPLETED | OUTPATIENT
Start: 2023-05-09 | End: 2023-05-09

## 2023-05-09 RX ADMIN — LIDOCAINE HYDROCHLORIDE 2 ML: 20 INJECTION, SOLUTION EPIDURAL; INFILTRATION; INTRACAUDAL; PERINEURAL at 08:31

## 2023-05-09 RX ADMIN — IOHEXOL 2 ML: 300 INJECTION, SOLUTION INTRAVENOUS at 08:30

## 2023-05-09 RX ADMIN — DEXAMETHASONE SODIUM PHOSPHATE 15 MG: 10 INJECTION, SOLUTION INTRAMUSCULAR; INTRAVENOUS at 08:31

## 2023-05-09 NOTE — H&P
History of Present Illness: The patient is a 60 y o  female who presents with complaints of low back and leg pain  Past Medical History:   Diagnosis Date   • Chronic osteoarthritis 03/17/2012       Past Surgical History:   Procedure Laterality Date   • ANGIOPLASTY Left    • LAPAROSCOPY           Current Outpatient Medications:   •  aspirin (ECOTRIN LOW STRENGTH) 81 mg EC tablet, Take 81 mg by mouth, Disp: , Rfl:   •  atorvastatin (LIPITOR) 40 mg tablet, Take 40 mg by mouth daily, Disp: , Rfl:   •  busPIRone (BUSPAR) 5 mg tablet, Take 1 tablet (5 mg total) by mouth 2 (two) times a day, Disp: 180 tablet, Rfl: 3  •  cilostazol (PLETAL) 50 mg tablet, Take 100 mg by mouth 2 (two) times a day, Disp: , Rfl:   •  hydrOXYzine HCL (ATARAX) 10 mg tablet, Take 1 tablet (10 mg total) by mouth every 6 (six) hours as needed for anxiety, Disp: 30 tablet, Rfl: 0  •  methocarbamol (ROBAXIN) 500 mg tablet, Take 1 tablet (500 mg total) by mouth 3 (three) times a day for 14 days, Disp: 42 tablet, Rfl: 0  •  propranolol (INDERAL) 40 mg tablet, Take 1 tablet (40 mg total) by mouth 2 (two) times a day, Disp: 90 tablet, Rfl: 3  •  sertraline (ZOLOFT) 100 mg tablet, Take 1 tablet (100 mg total) by mouth daily, Disp: 90 tablet, Rfl: 3    Allergies   Allergen Reactions   • No Active Allergies        Physical Exam:   Vitals:    05/09/23 0813   BP: 155/88   Pulse: 62   Resp: 18   Temp: 97 5 °F (36 4 °C)   SpO2: 99%     General: Awake, Alert, Oriented x 3, Mood and affect appropriate  Respiratory: Respirations even and unlabored  Cardiovascular: Peripheral pulses intact; no edema  Musculoskeletal Exam: bilateral lumbar paraspinals TTP    ASA Score: 3    Patient/Chart Verification  Patient ID Verified: Verbal  ID Band Applied: No  Consents Confirmed: Procedural, To be obtained in the Pre-Procedure area  Interval H&P(within 24 hr) Complete (required for Outpatients and Surgery Admit only):  To be obtained in the Pre-Procedure area  Allergies Reviewed: Yes  Anticoag/NSAID held?: Yes (LD Cilostazol 5/5 JW aware)  Currently on antibiotics?: No  Pregnancy denied?: NA    Assessment:   1   Lumbar radiculopathy        Plan: Bilateral L4 TFESI

## 2023-05-09 NOTE — DISCHARGE INSTRUCTIONS
Epidural Steroid Injection   WHAT YOU NEED TO KNOW:   An epidural steroid injection (PEDRO PABLO) is a procedure to inject steroid medicine into the epidural space  The epidural space is between your spinal cord and vertebrae  Steroids reduce inflammation and fluid buildup in your spine that may be causing pain  You may be given pain medicine along with the steroids  ACTIVITY  Do not drive or operate machinery today  No strenuous activity today - bending, lifting, etc   You may resume normal activites starting tomorrow - start slowly and as tolerated  You may shower today, but no tub baths or hot tubs  You may have numbness for several hours from the local anesthetic  Please use caution and common sense, especially with weight-bearing activities  CARE OF THE INJECTION SITE  If you have soreness or pain, apply ice to the area today (20 minutes on/20 minutes off)  Starting tomorrow, you may use warm, moist heat or ice if needed  You may have an increase or change in your discomfort for 36-48 hours after your treatment  Apply ice and continue with any pain medication you have been prescribed  Notify the [facility] if you have any of the following: redness, drainage, swelling, headache, stiff neck or fever above 100°F     SPECIAL INSTRUCTIONS  Our office will contact you in approximately 7 days for a progress report  MEDICATIONS  Continue to take all routine medications  Our office may have instructed you to hold some medications  Resume Cilastozol 5/10/23    As no general anesthesia was used in today's procedure, you should not experience any side effects related to anesthesia  If you are diabetic, the steroids used in today's injection may temporarily increase your blood sugar levels after the first few days after your injection   Please keep a close eye on your sugars and alert the doctor who manages your diabetes if your sugars are significantly high from your baseline or you are symptomatic  If you have a problem specifically related to your procedure, please call our office at [phone]  Problems not related to your procedure should be directed to your primary care physician

## 2023-05-16 ENCOUNTER — TELEPHONE (OUTPATIENT)
Dept: PAIN MEDICINE | Facility: CLINIC | Age: 63
End: 2023-05-16

## 2023-08-27 DIAGNOSIS — R00.2 PALPITATION: ICD-10-CM

## 2023-08-28 ENCOUNTER — RA CDI HCC (OUTPATIENT)
Dept: OTHER | Facility: HOSPITAL | Age: 63
End: 2023-08-28

## 2023-08-28 RX ORDER — PROPRANOLOL HYDROCHLORIDE 40 MG/1
40 TABLET ORAL 2 TIMES DAILY
Qty: 90 TABLET | Refills: 3 | Status: SHIPPED | OUTPATIENT
Start: 2023-08-28

## 2023-08-28 NOTE — PROGRESS NOTES
[address] [facility] coding opportunities       Chart reviewed, no opportunity found: CHART REVIEWED, NO OPPORTUNITY FOUND        Patients Insurance     Medicare Insurance: Medicare

## 2023-10-05 ENCOUNTER — OFFICE VISIT (OUTPATIENT)
Dept: FAMILY MEDICINE CLINIC | Facility: CLINIC | Age: 63
End: 2023-10-05

## 2023-10-05 VITALS
OXYGEN SATURATION: 100 % | SYSTOLIC BLOOD PRESSURE: 140 MMHG | TEMPERATURE: 95.3 F | BODY MASS INDEX: 19.39 KG/M2 | DIASTOLIC BLOOD PRESSURE: 80 MMHG | HEART RATE: 70 BPM | WEIGHT: 123.8 LBS

## 2023-10-05 DIAGNOSIS — M48.061 SPINAL STENOSIS OF LUMBAR REGION, UNSPECIFIED WHETHER NEUROGENIC CLAUDICATION PRESENT: Primary | ICD-10-CM

## 2023-10-05 DIAGNOSIS — M79.672 LEFT FOOT PAIN: ICD-10-CM
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
## 2023-10-05 PROBLEM — M79.673 PAIN IN FOOT: Status: ACTIVE | Noted: 2023-10-05

## 2023-10-05 NOTE — ASSESSMENT & PLAN NOTE
Being followed by pain management and receiving steroid injections  Arabella Bassett duty excuse given as patient has pain with prolonged sitting

## 2023-10-05 NOTE — ASSESSMENT & PLAN NOTE
Insect bite of right lower extremity near ankle approximately two weeks ago   · Area was erythematous and swollen with small amount of drainage per patient   · Today area is clean, dry and area around the wound is healing well   No concerns for infection of exposure to lyme disease at this time

## 2023-10-05 NOTE — ASSESSMENT & PLAN NOTE
Hit foot against object while walking around in the home a couple of days ago   The 4th and 5th toes still swollen and bruised   Xray foot ordered

## 2023-10-05 NOTE — PROGRESS NOTES
Name: Eileen Martinez      : 1960      MRN: [id number]  Encounter Provider: SUSANA Galaviz  Encounter Date: 10/5/2023   Encounter department: [facility]     1. Spinal stenosis of lumbar region, unspecified whether neurogenic claudication present  Assessment & Plan:  Being followed by pain management and receiving steroid injections  Jury duty excuse given as patient has pain with prolonged sitting       2. Bug bite without infection, initial encounter  Assessment & Plan:  Insect bite of right lower extremity near ankle approximately two weeks ago   · Area was erythematous and swollen with small amount of drainage per patient   · Today area is clean, dry and area around the wound is healing well   No concerns for infection of exposure to lyme disease at this time       3. Left foot pain  Assessment & Plan:  Hit foot against object while walking around in the home a couple of days ago   The 4th and 5th toes still swollen and bruised   Xray foot ordered     Orders:  -     XR foot 3+ vw left; Future; Expected date: 10/05/2023         Subjective      Presents today for multiple complaints     Back pain - spinal stenosis     Insect bite a couple of weeks ago swollen and popped, has been healing well without complications but would like it evaluated     Left foot pain hit against an object and has not healed     Back Pain  This is a chronic problem. The current episode started in the past 7 days. The pain is present in the lumbar spine. Pertinent negatives include no abdominal pain, chest pain, dysuria, fever or headaches. Insect Bite  Associated symptoms include joint swelling. Pertinent negatives include no abdominal pain, arthralgias, chest pain, chills, congestion, coughing, fatigue, fever, headaches, nausea, rash, sore throat or vomiting. Review of Systems   Constitutional: Negative for activity change, chills, fatigue and fever.    HENT: Negative for congestion, ear pain, rhinorrhea, sore throat and trouble swallowing. Eyes: Negative for pain and visual disturbance. Respiratory: Negative for cough, chest tightness and shortness of breath. Cardiovascular: Negative for chest pain, palpitations and leg swelling. Gastrointestinal: Negative for abdominal pain, constipation, diarrhea, nausea and vomiting. Genitourinary: Negative for difficulty urinating, dysuria, hematuria and urgency. Musculoskeletal: Positive for back pain, gait problem and joint swelling. Negative for arthralgias. Skin: Positive for wound. Negative for color change and rash. Neurological: Negative for dizziness, seizures, syncope and headaches. Psychiatric/Behavioral: Negative for dysphoric mood. The patient is not nervous/anxious. All other systems reviewed and are negative. Current Outpatient Medications on File Prior to Visit   Medication Sig   • aspirin (ECOTRIN LOW STRENGTH) 81 mg EC tablet Take 81 mg by mouth   • busPIRone (BUSPAR) 5 mg tablet Take 1 tablet (5 mg total) by mouth 2 (two) times a day   • cilostazol (PLETAL) 50 mg tablet Take 100 mg by mouth 2 (two) times a day   • hydrOXYzine HCL (ATARAX) 10 mg tablet Take 1 tablet (10 mg total) by mouth every 6 (six) hours as needed for anxiety   • propranolol (INDERAL) 40 mg tablet TAKE 1 TABLET BY MOUTH TWICE A DAY   • sertraline (ZOLOFT) 100 mg tablet Take 1 tablet (100 mg total) by mouth daily   • atorvastatin (LIPITOR) 40 mg tablet Take 40 mg by mouth daily   • methocarbamol (ROBAXIN) 500 mg tablet Take 1 tablet (500 mg total) by mouth 3 (three) times a day for 14 days       Objective     /80 (BP Location: Left arm, Patient Position: Sitting, Cuff Size: Adult)   Pulse 70   Temp (!) 95.3 °F (35.2 °C) (Temporal)   Wt 56.2 kg (123 lb 12.8 oz)   SpO2 100%   BMI 19.39 kg/m²     Physical Exam  Vitals and nursing note reviewed. Constitutional:       Appearance: Normal appearance.  She is normal weight. HENT:      Head: Normocephalic. Right Ear: Tympanic membrane, ear canal and external ear normal. There is no impacted cerumen. Left Ear: Tympanic membrane, ear canal and external ear normal. There is no impacted cerumen. Nose: Nose normal.      Mouth/Throat:      Mouth: Mucous membranes are moist.      Pharynx: Oropharynx is clear. Eyes:      Extraocular Movements: Extraocular movements intact. Conjunctiva/sclera: Conjunctivae normal.      Pupils: Pupils are equal, round, and reactive to light. Cardiovascular:      Rate and Rhythm: Normal rate and regular rhythm. Pulses: Normal pulses. Heart sounds: Normal heart sounds. Pulmonary:      Effort: Pulmonary effort is normal.      Breath sounds: Normal breath sounds. Abdominal:      General: Bowel sounds are normal. There is no distension. Palpations: Abdomen is soft. Tenderness: There is no abdominal tenderness. Musculoskeletal:         General: Normal range of motion. Cervical back: Normal range of motion. Skin:     General: Skin is warm. Capillary Refill: Capillary refill takes less than 2 seconds. Neurological:      General: No focal deficit present. Mental Status: She is alert and oriented to person, place, and time. Mental status is at baseline. Psychiatric:         Mood and Affect: Mood normal.         Behavior: Behavior normal.         Thought Content:  Thought content normal.         Judgment: Judgment normal.       Cisco Fabry, CRNP

## 2023-10-05 NOTE — LETTER
Date: 10/5/2023    To whom it may concern: This is to certify that Coral Shah has been under my care for the following diagnosis: Spinal stenosis of the lumbar region with sciatica and radiculopathy. I feel that Coral Shah is unable to serve on [address] Duty at this time for the above mentioned medical reasons.                   Sincerely,  SUSANA Briggs

## 2023-10-16 ENCOUNTER — TELEPHONE (OUTPATIENT)
Age: 63
End: 2023-10-16

## 2023-10-16 NOTE — TELEPHONE ENCOUNTER
S/w the patient to inquire and it looks like she had a B/L TFESI on 5/9/23. She stated she did really well with it but now the pain has returned. It continues in her LB and radiates to both hips. Pain has been running to about a 6. She takes a baby ASA daily. Ok to schedule?

## 2023-10-16 NOTE — TELEPHONE ENCOUNTER
Caller: Savannah Titus     Doctor: Dr Lexi Jarquin for call: Patient calling asking to schedule another injection please advise     Call back#: [phone]

## 2023-10-17 DIAGNOSIS — M54.16 LUMBAR RADICULOPATHY: Primary | ICD-10-CM

## 2023-10-17 NOTE — TELEPHONE ENCOUNTER
Called patient schedule procedure-reviewed all instructions by phone upon scheduling.  Mailed copy to home

## 2023-11-01 ENCOUNTER — HOSPITAL ENCOUNTER (OUTPATIENT)
Dept: RADIOLOGY | Facility: CLINIC | Age: 63
Discharge: HOME | End: 2023-11-01
Payer: COMMERCIAL

## 2023-11-01 VITALS
HEART RATE: 62 BPM | OXYGEN SATURATION: 99 % | SYSTOLIC BLOOD PRESSURE: 153 MMHG | TEMPERATURE: 97.1 F | DIASTOLIC BLOOD PRESSURE: 82 MMHG | RESPIRATION RATE: 20 BRPM

## 2023-11-01 DIAGNOSIS — M54.16 LUMBAR RADICULOPATHY: ICD-10-CM

## 2023-11-01 PROCEDURE — 64483 NJX AA&/STRD TFRM EPI L/S 1: CPT | Performed by: ANESTHESIOLOGY

## 2023-11-01 RX ORDER — PAPAVERINE HCL 150 MG
20 CAPSULE, EXTENDED RELEASE ORAL ONCE
Status: COMPLETED | OUTPATIENT
Start: 2023-11-01 | End: 2023-11-01

## 2023-11-01 RX ADMIN — LIDOCAINE HYDROCHLORIDE 2 ML: 20 INJECTION, SOLUTION EPIDURAL; INFILTRATION; INTRACAUDAL; PERINEURAL at 14:42

## 2023-11-01 RX ADMIN — DEXAMETHASONE SODIUM PHOSPHATE 15 MG: 10 INJECTION, SOLUTION INTRAMUSCULAR; INTRAVENOUS at 14:41

## 2023-11-01 RX ADMIN — IOHEXOL 2 ML: 300 INJECTION, SOLUTION INTRAVENOUS at 14:42

## 2023-11-01 NOTE — H&P
History of Present Illness: The patient is a 60 y.o. female who presents with complaints of low back and leg pain. Past Medical History:   Diagnosis Date    Chronic osteoarthritis 03/17/2012       Past Surgical History:   Procedure Laterality Date    ANGIOPLASTY Left     LAPAROSCOPY           Current Outpatient Medications:     aspirin (ECOTRIN LOW STRENGTH) 81 mg EC tablet, Take 81 mg by mouth, Disp: , Rfl:     atorvastatin (LIPITOR) 40 mg tablet, Take 40 mg by mouth daily, Disp: , Rfl:     busPIRone (BUSPAR) 5 mg tablet, Take 1 tablet (5 mg total) by mouth 2 (two) times a day, Disp: 180 tablet, Rfl: 3    cilostazol (PLETAL) 50 mg tablet, Take 100 mg by mouth 2 (two) times a day (Patient not taking: Reported on 11/1/2023), Disp: , Rfl:     hydrOXYzine HCL (ATARAX) 10 mg tablet, Take 1 tablet (10 mg total) by mouth every 6 (six) hours as needed for anxiety, Disp: 30 tablet, Rfl: 0    methocarbamol (ROBAXIN) 500 mg tablet, Take 1 tablet (500 mg total) by mouth 3 (three) times a day for 14 days, Disp: 42 tablet, Rfl: 0    propranolol (INDERAL) 40 mg tablet, TAKE 1 TABLET BY MOUTH TWICE A DAY, Disp: 90 tablet, Rfl: 3    sertraline (ZOLOFT) 100 mg tablet, Take 1 tablet (100 mg total) by mouth daily, Disp: 90 tablet, Rfl: 3    Allergies   Allergen Reactions    No Active Allergies        Physical Exam:   Vitals:    11/01/23 1417   BP: 148/93   Pulse: 94   Resp: 18   Temp: (!) 97.1 °F (36.2 °C)   SpO2: 98%     General: Awake, Alert, Oriented x 3, Mood and affect appropriate  Respiratory: Respirations even and unlabored  Cardiovascular: Peripheral pulses intact; no edema  Musculoskeletal Exam: Bilateral lumbar paraspinals tender to palpation    ASA Score: 2    Patient/Chart Verification  Patient ID Verified: Verbal  ID Band Applied: No  Consents Confirmed: Procedural  H&P( within 30 days) Verified:  To be obtained in the Pre-Procedure area  Interval H&P(within 24 hr) Complete (required for Outpatients and Surgery Admit only): To be obtained in the Pre-Procedure area  Allergies Reviewed: Yes  Anticoag/NSAID held?: No  Currently on antibiotics?: No  Pre-op Lab/Test Results Available: N/A  Pregnancy Lab Collected: N/A comment    Assessment:   1.  Lumbar radiculopathy        Plan: bilateral L4 TFESI

## 2023-11-01 NOTE — DISCHARGE INSTRUCTIONS
Epidural Steroid Injection   WHAT YOU NEED TO KNOW:   An epidural steroid injection (PEDRO PABLO) is a procedure to inject steroid medicine into the epidural space. The epidural space is between your spinal cord and vertebrae. Steroids reduce inflammation and fluid buildup in your spine that may be causing pain. You may be given pain medicine along with the steroids. ACTIVITY  Do not drive or operate machinery today. No strenuous activity today - bending, lifting, etc.  You may resume normal activites starting tomorrow - start slowly and as tolerated. You may shower today, but no tub baths or hot tubs. You may have numbness for several hours from the local anesthetic. Please use caution and common sense, especially with weight-bearing activities. CARE OF THE INJECTION SITE  If you have soreness or pain, apply ice to the area today (20 minutes on/20 minutes off). Starting tomorrow, you may use warm, moist heat or ice if needed. You may have an increase or change in your discomfort for 36-48 hours after your treatment. Apply ice and continue with any pain medication you have been prescribed. Notify the [facility] if you have any of the following: redness, drainage, swelling, headache, stiff neck or fever above 100°F.    SPECIAL INSTRUCTIONS  Our office will contact you in approximately 7 days for a progress report. MEDICATIONS  Continue to take all routine medications. Our office may have instructed you to hold some medications. As no general anesthesia was used in today's procedure, you should not experience any side effects related to anesthesia. If you are diabetic, the steroids used in today's injection may temporarily increase your blood sugar levels after the first few days after your injection. Please keep a close eye on your sugars and alert the doctor who manages your diabetes if your sugars are significantly high from your baseline or you are symptomatic.      If you have a problem specifically related to your procedure, please call our office at [phone]. Problems not related to your procedure should be directed to your primary care physician.

## 2023-11-08 ENCOUNTER — TELEPHONE (OUTPATIENT)
Dept: PAIN MEDICINE | Facility: CLINIC | Age: 63
End: 2023-11-08

## 2023-11-15 NOTE — TELEPHONE ENCOUNTER
Patient Reports   left side  90                             Right side    having some pain and burning   %     improvement post injection 2week    Pain Level    left side 1-                       Right side feels pinched goes down back of leg 5 /10

## 2024-01-28 DIAGNOSIS — F41.9 ANXIETY: ICD-10-CM

## 2024-01-29 RX ORDER — SERTRALINE HYDROCHLORIDE 100 MG/1
100 TABLET, FILM COATED ORAL DAILY
Qty: 90 TABLET | Refills: 1 | Status: SHIPPED | OUTPATIENT
Start: 2024-01-29

## 2024-01-30 RX ORDER — BUSPIRONE HYDROCHLORIDE 5 MG/1
5 TABLET ORAL 2 TIMES DAILY
Qty: 180 TABLET | Refills: 3 | Status: SHIPPED | OUTPATIENT
Start: 2024-01-30

## 2024-02-12 ENCOUNTER — TELEPHONE (OUTPATIENT)
Dept: FAMILY MEDICINE CLINIC | Facility: CLINIC | Age: 64
End: 2024-02-12

## 2024-06-05 ENCOUNTER — TELEPHONE (OUTPATIENT)
Dept: FAMILY MEDICINE CLINIC | Facility: CLINIC | Age: 64
End: 2024-06-05

## 2024-08-05 DIAGNOSIS — R00.2 PALPITATION: ICD-10-CM

## 2024-08-05 DIAGNOSIS — F41.9 ANXIETY: ICD-10-CM

## 2024-08-05 RX ORDER — SERTRALINE HYDROCHLORIDE 100 MG/1
100 TABLET, FILM COATED ORAL DAILY
Qty: 90 TABLET | Refills: 1 | Status: SHIPPED | OUTPATIENT
Start: 2024-08-05

## 2024-08-05 RX ORDER — PROPRANOLOL HYDROCHLORIDE 40 MG/1
40 TABLET ORAL 2 TIMES DAILY
Qty: 180 TABLET | Refills: 1 | Status: SHIPPED | OUTPATIENT
Start: 2024-08-05

## 2024-08-23 ENCOUNTER — OFFICE VISIT (OUTPATIENT)
Dept: FAMILY MEDICINE CLINIC | Facility: CLINIC | Age: 64
End: 2024-08-23
Payer: COMMERCIAL

## 2024-08-23 VITALS
OXYGEN SATURATION: 99 % | WEIGHT: 125 LBS | TEMPERATURE: 97.8 F | DIASTOLIC BLOOD PRESSURE: 50 MMHG | HEART RATE: 80 BPM | BODY MASS INDEX: 19.62 KG/M2 | SYSTOLIC BLOOD PRESSURE: 120 MMHG | HEIGHT: 67 IN

## 2024-08-23 DIAGNOSIS — Z89.511 HX OF RIGHT BKA (HCC): ICD-10-CM

## 2024-08-23 DIAGNOSIS — Z87.891 FORMER SMOKER: ICD-10-CM

## 2024-08-23 DIAGNOSIS — Z53.20 MAMMOGRAM DECLINED: ICD-10-CM

## 2024-08-23 DIAGNOSIS — R00.2 HEART PALPITATIONS: ICD-10-CM

## 2024-08-23 DIAGNOSIS — Z13.31 POSITIVE DEPRESSION SCREENING: ICD-10-CM

## 2024-08-23 DIAGNOSIS — K12.2 MOUTH ABSCESS: ICD-10-CM

## 2024-08-23 DIAGNOSIS — E78.9 LIPID DISORDER: ICD-10-CM

## 2024-08-23 DIAGNOSIS — F32.A ANXIETY AND DEPRESSION: ICD-10-CM

## 2024-08-23 DIAGNOSIS — R23.8 SKIN BREAKDOWN: ICD-10-CM

## 2024-08-23 DIAGNOSIS — Z00.00 MEDICARE ANNUAL WELLNESS VISIT, SUBSEQUENT: Primary | ICD-10-CM

## 2024-08-23 DIAGNOSIS — I73.9 PAD (PERIPHERAL ARTERY DISEASE) (HCC): ICD-10-CM

## 2024-08-23 DIAGNOSIS — Z53.20 SCREENING FOR MALIGNANT NEOPLASM OF COLON DECLINED: ICD-10-CM

## 2024-08-23 DIAGNOSIS — F41.9 ANXIETY AND DEPRESSION: ICD-10-CM

## 2024-08-23 PROBLEM — Z87.39 HISTORY OF HERNIATED INTERVERTEBRAL DISC: Status: RESOLVED | Noted: 2021-10-01 | Resolved: 2024-08-23
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
## 2024-08-23 PROBLEM — M25.559 HIP PAIN: Status: RESOLVED | Noted: 2023-03-02 | Resolved: 2024-08-23

## 2024-08-23 PROBLEM — M75.102 UNSPECIFIED ROTATOR CUFF TEAR OR RUPTURE OF LEFT SHOULDER, NOT SPECIFIED AS TRAUMATIC: Status: RESOLVED | Noted: 2022-05-23 | Resolved: 2024-08-23

## 2024-08-23 PROCEDURE — G0439 PPPS, SUBSEQ VISIT: HCPCS

## 2024-08-23 PROCEDURE — 99214 OFFICE O/P EST MOD 30 MIN: CPT

## 2024-08-23 RX ORDER — BUSPIRONE HYDROCHLORIDE 7.5 MG/1
7.5 TABLET ORAL 2 TIMES DAILY
Qty: 180 TABLET | Refills: 0 | Status: SHIPPED | OUTPATIENT
Start: 2024-08-23

## 2024-08-23 RX ORDER — SERTRALINE HYDROCHLORIDE 100 MG/1
100 TABLET, FILM COATED ORAL 2 TIMES DAILY
Qty: 180 TABLET | Refills: 1 | Status: SHIPPED | OUTPATIENT
Start: 2024-08-23

## 2024-08-23 RX ORDER — APIXABAN 5 MG/1
5 TABLET, FILM COATED ORAL 2 TIMES DAILY
COMMUNITY

## 2024-08-23 RX ORDER — ZINC OXIDE 16 %
OINTMENT (GRAM) TOPICAL DAILY
Qty: 454 G | Refills: 0 | Status: SHIPPED | OUTPATIENT
Start: 2024-08-23

## 2024-08-23 NOTE — PROGRESS NOTES
Assessment and Plan:     Problem List Items Addressed This Visit          Cardiovascular and Mediastinum    PAD (peripheral artery disease) (HCC)     Current medications: Eliquis 5mg and Atorvastatin 40mg     Followed by vascular surgery    Ecchymotic 1st and 5th toe since 09/21 12/22 Abdominal aortogram with femoral runoff, left  Findings: LLE proximal SFA occlusion to popliteal artery, will require a bypass in the near future   12/18/23 Vascular    Ischemic rest pain in the right foot and toes.   Right 4th toe is dusky, but not open wounds.  ELISABETH's R 0.67 L 0.63  12/19/24-12/23/24 Hospital admission   12/21/24 OR  Aortogram, RLE arteriogram with balloon angioplasty and stenting   01/05/24 Vascular   Patent right SFA stent without evidence of stenosis.   Pain that is improving daily, most likely related to revascularization of her RLE.   Follow up in 6 months with a repeat RLE duplex and LEADs.   01/25/24 - 02/04/24 Hospital admission   R 4th toe pain  Gangrene of the R 4th toe and possible cellulitis.  MRI Toe   Fourth toe soft tissue swelling, nonspecific edema in the middle phalanx of the fourth toe favored to represent reactive osteitis, early findings of OM are difficult to exclude.   Vascular surgery and podiatry consulted, patient opted for R BKA on antibiotics IV ancef/vancomycin  01/30/24 R BKA   Declined rehab  Discharged home   03/04/24 Vascular   Post op evaluation of right BKA.  Phantom limb pain.   Wearing ampu shield and washing stump daily.            Musculoskeletal and Integument    Skin breakdown     Skin breakdown on buttock from prolonged sitting   Encouraged changing positions frequently   Zinc paste prescribed.   No open areas          Relevant Medications    Zinc Oxide (Boudreauxs Butt Paste) 16 % OINT       Behavioral Health    Anxiety and depression     PHQ9 Score 10  Emotional support provided  Denies suicidal ideations or thoughts  Current medications: Zoloft 100mg, Buspar 5mg BID,  Hydroxyzine 10mg PRN   New exacerbation of symptoms since R BKA having symptoms of PTSD   New medication: Zoloft 100mg BID, Buspar 7.5mg BID, Hydroxyzine 10mg PRN   Discussed lifestyle modifications and relaxation methods   Referral to behavioral health placed today   Patient aware of extended wait times            Relevant Medications    sertraline (ZOLOFT) 100 mg tablet    busPIRone (BUSPAR) 7.5 mg tablet    Other Relevant Orders    Ambulatory referral to Psych Services    Former smoker     Quit smoking September 2023             Surgery/Wound/Pain    Hx of right BKA (HCC)     01/30/24 Right BKA   Wears prosthetic on R stump          Relevant Orders    Ambulatory referral to Psych Services       Other    Heart palpitations     Currently being seen by cardiology   Propanolol 40mg          Lipid disorder     Current medication: Atorvastatin 40mg   Continue current medication regimen          Mammogram declined     Risks and benefits discussed. Education and counseling provided.          Screening for malignant neoplasm of colon declined     Risks and benefits discussed. Education and counseling provided.           Other Visit Diagnoses       Medicare annual wellness visit, subsequent    -  Primary    Mouth abscess        Relevant Medications    amoxicillin-clavulanate (AUGMENTIN) 875-125 mg per tablet    Positive depression screening                Depression Screening and Follow-up Plan: Patient's depression screening was positive with a PHQ-9 score of 10.       Preventive health issues were discussed with patient, and age appropriate screening tests were ordered as noted in patient's After Visit Summary.  Personalized health advice and appropriate referrals for health education or preventive services given if needed, as noted in patient's After Visit Summary.     History of Present Illness:     Patient presents for a Medicare Wellness Visit    Dental Pain   This is a new problem. The current episode started in the  past 7 days. The problem occurs constantly. The problem has been gradually worsening. The pain is moderate. Associated symptoms include facial pain and thermal sensitivity. Pertinent negatives include no difficulty swallowing or fever. She has tried nothing for the symptoms. The treatment provided no relief.      Patient Care Team:  SUSANA Marques as PCP - General (Nurse Practitioner)     Review of Systems:     Review of Systems   Constitutional:  Negative for activity change, chills, fatigue and fever.   HENT:  Negative for congestion, ear pain, rhinorrhea, sore throat and trouble swallowing.    Eyes:  Negative for pain and visual disturbance.   Respiratory:  Negative for cough, chest tightness and shortness of breath.    Cardiovascular:  Negative for chest pain, palpitations and leg swelling.   Gastrointestinal:  Negative for abdominal pain, constipation, diarrhea, nausea and vomiting.   Genitourinary:  Negative for difficulty urinating, dysuria, hematuria and urgency.   Musculoskeletal:  Negative for arthralgias and back pain.   Skin:  Negative for color change and rash.   Neurological:  Negative for dizziness, seizures, syncope and headaches.   Psychiatric/Behavioral:  Negative for dysphoric mood. The patient is not nervous/anxious.    All other systems reviewed and are negative.     Problem List:     Patient Active Problem List   Diagnosis    Anxiety and depression    Heart palpitations    PAD (peripheral artery disease) (HCC)    Lipid disorder    Former smoker    Encounter for immunization    Alcohol abuse    Lumbar spondylosis    Spinal stenosis of lumbar region    Hx of right BKA (HCC)    Skin breakdown    Mammogram declined    Screening for malignant neoplasm of colon declined      Past Medical and Surgical History:     Past Medical History:   Diagnosis Date    Bug bite without infection 10/05/2023    Chronic osteoarthritis 03/17/2012    Hip pain 03/02/2023    History of herniated intervertebral disc  10/01/2021    Lumbar radiculopathy     Unspecified rotator cuff tear or rupture of left shoulder, not specified as traumatic 05/23/2022     Past Surgical History:   Procedure Laterality Date    ANGIOPLASTY Left     BELOW KNEE LEG AMPUTATION Right 01/2024    LAPAROSCOPY        Family History:     Family History   Problem Relation Age of Onset    No Known Problems Mother     Jeffery Parkinson White syndrome Father       Social History:     Social History     Socioeconomic History    Marital status: Single     Spouse name: None    Number of children: None    Years of education: None    Highest education level: None   Occupational History    None   Tobacco Use    Smoking status: Every Day     Current packs/day: 0.25     Average packs/day: 0.3 packs/day for 35.0 years (8.8 ttl pk-yrs)     Types: Cigarettes     Passive exposure: Current    Smokeless tobacco: Current   Vaping Use    Vaping status: Never Used   Substance and Sexual Activity    Alcohol use: Yes     Comment: socially    Drug use: Yes     Types: Marijuana    Sexual activity: Not Currently   Other Topics Concern    None   Social History Narrative    Consumes caffeine, coffee.     Social Determinants of Health     Financial Resource Strain: Low Risk  (1/26/2024)    [facility], [facility]    Overall Financial Resource Strain (CARDIA)     Difficulty of Paying Living Expenses: Not very hard   Food Insecurity: No Food Insecurity (8/23/2024)    Hunger Vital Sign     Worried About Running Out of Food in the Last Year: Never true     Ran Out of Food in the Last Year: Never true   Transportation Needs: No Transportation Needs (8/23/2024)    PRAPARE - Transportation     Lack of Transportation (Medical): No     Lack of Transportation (Non-Medical): No   Physical Activity: Not on file   Stress: Not on file   Social Connections: Not on file   Intimate Partner Violence: Not At Risk (1/26/2024)    [facility] from [facility], [facility]    Humiliation, Afraid, Rape, and Kick questionnaire     Fear of Current or Ex-Partner: No     Emotionally Abused: No     Physically Abused: No     Sexually Abused: No   Housing Stability: Unknown (8/23/2024)    Housing Stability Vital Sign     Unable to Pay for Housing in the Last Year: No     Number of Times Moved in the Last Year: Not on file     Homeless in the Last Year: No      Medications and Allergies:     Current Outpatient Medications   Medication Sig Dispense Refill    amoxicillin-clavulanate (AUGMENTIN) 875-125 mg per tablet Take 1 tablet by mouth every 12 (twelve) hours for 10 days 20 tablet 0    atorvastatin (LIPITOR) 40 mg tablet Take 40 mg by mouth daily      busPIRone (BUSPAR) 7.5 mg tablet Take 1 tablet (7.5 mg total) by mouth 2 (two) times a day 180 tablet 0    Eliquis 5 MG Take 5 mg by mouth 2 (two) times a day      hydrOXYzine HCL (ATARAX) 10 mg tablet Take 1 tablet (10 mg total) by mouth every 6 (six) hours as needed for anxiety 30 tablet 0    propranolol (INDERAL) 40 mg tablet TAKE 1 TABLET BY MOUTH TWICE A  tablet 1    sertraline (ZOLOFT) 100 mg tablet Take 1 tablet (100 mg total) by mouth 2 (two) times a day 180 tablet 1    Zinc Oxide (Boudreauxs Butt Paste) 16 % OINT Apply topically daily 454 g 0     No current facility-administered medications for this visit.     Allergies   Allergen Reactions    No Active Allergies       Immunizations:     Immunization History   Administered Date(s) Administered    COVID-19 PFIZER VACCINE 0.3 ML IM 04/19/2021, 05/10/2021    INFLUENZA 02/02/2023    Influenza, recombinant, quadrivalent,injectable, preservative free 02/02/2023    Tdap 11/27/2013      Health Maintenance:         Topic Date Due    HIV Screening  Never done    Cervical Cancer Screening  09/23/2024 (Originally [birth year]1980)    Colorectal Cancer Screening  08/23/2025 (Originally 11/3/2004)    Breast Cancer Screening: Mammogram  08/23/2025  (Originally 11/3/1999)    Hepatitis C Screening  Completed         Topic Date Due    Pneumococcal Vaccine: Pediatrics (0 to 5 Years) and At-Risk Patients (6 to 64 Years) (1 of 2 - PCV) Never done    Hepatitis A Vaccine (1 of 2 - Risk 2-dose series) Never done    COVID-19 Vaccine (3 - 2023-24 season) 09/01/2023    Influenza Vaccine (1) 09/01/2024      Medicare Screening Tests and Risk Assessments:     Collette is here for her Subsequent Wellness visit. Last Medicare Wellness visit information reviewed, patient interviewed and updates made to the record today.      Health Risk Assessment:   Patient rates overall health as fair. Patient feels that their physical health rating is slightly worse. Patient is dissatisfied with their life. Eyesight was rated as much worse. Hearing was rated as same. Patient feels that their emotional and mental health rating is slightly worse. Patients states they are sometimes angry. Patient states they are always unusually tired/fatigued. Pain experienced in the last 7 days has been none. Patient states that she has experienced no weight loss or gain in last 6 months.     Depression Screening:   PHQ-9 Score: 10      Fall Risk Screening:   In the past year, patient has experienced: history of falling in past year    Number of falls: 2 or more  Injured during fall?: Yes    Feels unsteady when standing or walking?: Yes    Worried about falling?: Yes      Urinary Incontinence Screening:   Patient has not leaked urine accidently in the last six months.     Home Safety:  Patient has trouble with stairs inside or outside of their home. Patient has working smoke alarms and has no working carbon monoxide detector. Home safety hazards include: none.     Nutrition:   Current diet is Regular.     Medications:   Patient is currently taking over-the-counter supplements. OTC medications include: see medication list. Patient is able to manage medications.     Activities of Daily Living  "(ADLs)/Instrumental Activities of Daily Living (IADLs):   Walk and transfer into and out of bed and chair?: Yes  Dress and groom yourself?: Yes    Bathe or shower yourself?: Yes    Feed yourself? Yes  Do your laundry/housekeeping?: No  Manage your money, pay your bills and track your expenses?: Yes  Make your own meals?: No    Do your own shopping?: No    Previous Hospitalizations:   Any hospitalizations or ED visits within the last 12 months?: Yes    How many hospitalizations have you had in the last year?: 1-2    Advance Care Planning:   Living will: Yes    Advanced directive: Yes      PREVENTIVE SCREENINGS        Diabetes Screening:     General: Screening Current      Lung Cancer Screening:     General: Screening Not Indicated      Hepatitis C Screening:    General: Screening Current    Screening, Brief Intervention, and Referral to Treatment (SBIRT)    Screening  Typical number of drinks in a day: 0  Typical number of drinks in a week: 0  Interpretation: Low risk drinking behavior.    Single Item Drug Screening:  How often have you used an illegal drug (including marijuana) or a prescription medication for non-medical reasons in the past year? daily or almost daily    Single Item Drug Screen Score: 4  Interpretation: POSITIVE screen for possible drug use disorder    Drug Abuse Screening Test (DAST-10):  1) Have you used drugs other than those required for medical reasons? Yes  2) Do you abuse more than one drug at a time? No  3) Are you always able to stop using drugs when you want to? Yes  4) Have you had \"blackouts\" or \"flashbacks\" as a result of drug use? No  5) Do you ever feel bad or guilty about your drug use? No  6) Does your spouse (or parents) ever complain about your involvement with drugs? No  7) Have you neglected your family because of your use of drugs? No  8) Have you engaged in illegal activities in order to obtain drugs? No  9) Have you ever experienced withdrawal symptoms (felt sick) when you " "stopped taking drugs? No  10) Have you had medical problems as a result of your drug use (e.g., memory loss, hepatitis, convulsions, bleeding, etc.)? No    DAST-10 Score: 1  Interpretation: Low level problems related to drug abuse    Time Spent  Time spent screening/evaluating the patient for alcohol misuse: 0 minutes. Time spent providing alcohol/substance abuse assessment and intervention services: 0 minutes.    Other Counseling Topics:   Car/seat belt/driving safety, skin self-exam, sunscreen and calcium and vitamin D intake and regular weightbearing exercise.     No results found.     Physical Exam:     /50 (BP Location: Left arm, Patient Position: Sitting, Cuff Size: Adult)   Pulse 80   Temp 97.8 °F (36.6 °C) (Temporal)   Ht 5' 7\" (1.702 m)   Wt 56.7 kg (125 lb)   SpO2 99%   BMI 19.58 kg/m²     Physical Exam  Vitals and nursing note reviewed.   Constitutional:       General: She is not in acute distress.     Appearance: Normal appearance. She is well-developed and normal weight.   HENT:      Head: Normocephalic and atraumatic.      Right Ear: Tympanic membrane, ear canal and external ear normal. There is no impacted cerumen.      Left Ear: Tympanic membrane, ear canal and external ear normal. There is no impacted cerumen.      Nose: Nose normal.      Mouth/Throat:      Mouth: Mucous membranes are moist.      Pharynx: Oropharynx is clear.   Eyes:      Extraocular Movements: Extraocular movements intact.      Conjunctiva/sclera: Conjunctivae normal.      Pupils: Pupils are equal, round, and reactive to light.   Cardiovascular:      Rate and Rhythm: Normal rate and regular rhythm.      Pulses: Normal pulses.      Heart sounds: Normal heart sounds. No murmur heard.  Pulmonary:      Effort: Pulmonary effort is normal. No respiratory distress.      Breath sounds: Normal breath sounds.   Abdominal:      General: Bowel sounds are normal.      Palpations: Abdomen is soft.      Tenderness: There is no " abdominal tenderness.   Musculoskeletal:         General: Normal range of motion.      Cervical back: Normal range of motion and neck supple.      Right lower leg: No edema.      Left lower leg: No edema.   Skin:     General: Skin is warm and dry.      Capillary Refill: Capillary refill takes less than 2 seconds.   Neurological:      General: No focal deficit present.      Mental Status: She is alert and oriented to person, place, and time. Mental status is at baseline.   Psychiatric:         Mood and Affect: Mood normal.         Behavior: Behavior normal.         Thought Content: Thought content normal.         Judgment: Judgment normal.        Patient Instructions   Medicare Preventive Visit Patient Instructions  Thank you for completing your Welcome to Medicare Visit or Medicare Annual Wellness Visit today. Your next wellness visit will be due in one year (8/24/2025).  The screening/preventive services that you may require over the next 5-10 years are detailed below. Some tests may not apply to you based off risk factors and/or age. Screening tests ordered at today's visit but not completed yet may show as past due. Also, please note that scanned in results may not display below.  Preventive Screenings:  Service Recommendations Previous Testing/Comments   Colorectal Cancer Screening  * Colonoscopy    * Fecal Occult Blood Test (FOBT)/Fecal Immunochemical Test (FIT)  * Fecal DNA/Cologuard Test  * Flexible Sigmoidoscopy Age: 45-75 years old   Colonoscopy: every 10 years (may be performed more frequently if at higher risk)  OR  FOBT/FIT: every 1 year  OR  Cologuard: every 3 years  OR  Sigmoidoscopy: every 5 years  Screening may be recommended earlier than age 45 if at higher risk for colorectal cancer. Also, an individualized decision between you and your healthcare provider will decide whether screening between the ages of 76-85 would be appropriate. Colonoscopy: Not on file  FOBT/FIT: Not on file  Cologuard: Not on  file  Sigmoidoscopy: Not on file          Breast Cancer Screening Age: 40+ years old  Frequency: every 1-2 years  Not required if history of left and right mastectomy Mammogram: Not on file        Cervical Cancer Screening Between the ages of 21-29, pap smear recommended once every 3 years.   Between the ages of 30-65, can perform pap smear with HPV co-testing every 5 years.   Recommendations may differ for women with a history of total hysterectomy, cervical cancer, or abnormal pap smears in past. Pap Smear: Not on file        Hepatitis C Screening Once for adults born between 1945 and 1965  More frequently in patients at high risk for Hepatitis C Hep C Antibody: Not on file    Screening Current   Diabetes Screening 1-2 times per year if you're at risk for diabetes or have pre-diabetes Fasting glucose: No results in last 5 years (No results in last 5 years)  A1C: No results in last 5 years (No results in last 5 years)  Screening Current   Cholesterol Screening Once every 5 years if you don't have a lipid disorder. May order more often based on risk factors. Lipid panel: Not on file          Other Preventive Screenings Covered by Medicare:  Abdominal Aortic Aneurysm (AAA) Screening: covered once if your at risk. You're considered to be at risk if you have a family history of AAA.  Lung Cancer Screening: covers low dose CT scan once per year if you meet all of the following conditions: (1) Age 55-77; (2) No signs or symptoms of lung cancer; (3) Current smoker or have quit smoking within the last 15 years; (4) You have a tobacco smoking history of at least 20 pack years (packs per day multiplied by number of years you smoked); (5) You get a written order from a healthcare provider.  Glaucoma Screening: covered annually if you're considered high risk: (1) You have diabetes OR (2) Family history of glaucoma OR (3)  aged 50 and older OR (4)  American aged 65 and older  Osteoporosis Screening:  covered every 2 years if you meet one of the following conditions: (1) You're estrogen deficient and at risk for osteoporosis based off medical history and other findings; (2) Have a vertebral abnormality; (3) On glucocorticoid therapy for more than 3 months; (4) Have primary hyperparathyroidism; (5) On osteoporosis medications and need to assess response to drug therapy.   Last bone density test (DXA Scan): Not on file.  HIV Screening: covered annually if you're between the age of 15-65. Also covered annually if you are younger than 15 and older than 65 with risk factors for HIV infection. For pregnant patients, it is covered up to 3 times per pregnancy.    Immunizations:  Immunization Recommendations   Influenza Vaccine Annual influenza vaccination during flu season is recommended for all persons aged >= 6 months who do not have contraindications   Pneumococcal Vaccine   * Pneumococcal conjugate vaccine = PCV13 (Prevnar 13), PCV15 (Vaxneuvance), PCV20 (Prevnar 20)  * Pneumococcal polysaccharide vaccine = PPSV23 (Pneumovax) Adults 19-65 yo with certain risk factors or if 65+ yo  If never received any pneumonia vaccine: recommend Prevnar 20 (PCV20)  Give PCV20 if previously received 1 dose of PCV13 or PPSV23   Hepatitis B Vaccine 3 dose series if at intermediate or high risk (ex: diabetes, end stage renal disease, liver disease)   Respiratory syncytial virus (RSV) Vaccine - COVERED BY MEDICARE PART D  * RSVPreF3 (Arexvy) CDC recommends that adults 60 years of age and older may receive a single dose of RSV vaccine using shared clinical decision-making (SCDM)   Tetanus (Td) Vaccine - COST NOT COVERED BY MEDICARE PART B Following completion of primary series, a booster dose should be given every 10 years to maintain immunity against tetanus. Td may also be given as tetanus wound prophylaxis.   Tdap Vaccine - COST NOT COVERED BY MEDICARE PART B Recommended at least once for all adults. For pregnant patients, recommended  with each pregnancy.   Shingles Vaccine (Shingrix) - COST NOT COVERED BY MEDICARE PART B  2 shot series recommended in those 19 years and older who have or will have weakened immune systems or those 50 years and older     Health Maintenance Due:      Topic Date Due    HIV Screening  Never done    Cervical Cancer Screening  Never done    Breast Cancer Screening: Mammogram  Never done    Colorectal Cancer Screening  Never done    Hepatitis C Screening  Completed     Immunizations Due:      Topic Date Due    Pneumococcal Vaccine: Pediatrics (0 to 5 Years) and At-Risk Patients (6 to 64 Years) (1 of 2 - PCV) Never done    Hepatitis A Vaccine (1 of 2 - Risk 2-dose series) Never done    COVID-19 Vaccine (3 - 2023-24 season) 09/01/2023    Influenza Vaccine (1) 09/01/2024     Advance Directives   What are advance directives?  Advance directives are legal documents that state your wishes and plans for medical care. These plans are made ahead of time in case you lose your ability to make decisions for yourself. Advance directives can apply to any medical decision, such as the treatments you want, and if you want to donate organs.   What are the types of advance directives?  There are many types of advance directives, and each state has rules about how to use them. You may choose a combination of any of the following:  Living will:  This is a written record of the treatment you want. You can also choose which treatments you do not want, which to limit, and which to stop at a certain time. This includes surgery, medicine, IV fluid, and tube feedings.   Durable power of  for healthcare (DPAHC):  This is a written record that states who you want to make healthcare choices for you when you are unable to make them for yourself. This person, called a proxy, is usually a family member or a friend. You may choose more than 1 proxy.  Do not resuscitate (DNR) order:  A DNR order is used in case your heart stops beating or you  stop breathing. It is a request not to have certain forms of treatment, such as CPR. A DNR order may be included in other types of advance directives.  Medical directive:  This covers the care that you want if you are in a coma, near death, or unable to make decisions for yourself. You can list the treatments you want for each condition. Treatment may include pain medicine, surgery, blood transfusions, dialysis, IV or tube feedings, and a ventilator (breathing machine).  Values history:  This document has questions about your views, beliefs, and how you feel and think about life. This information can help others choose the care that you would choose.  Why are advance directives important?  An advance directive helps you control your care. Although spoken wishes may be used, it is better to have your wishes written down. Spoken wishes can be misunderstood, or not followed. Treatments may be given even if you do not want them. An advance directive may make it easier for your family to make difficult choices about your care.   Cigarette Smoking and Your Health   Risks to your health if you smoke:  Nicotine and other chemicals found in tobacco damage every cell in your body. Even if you are a light smoker, you have an increased risk for cancer, heart disease, and lung disease. If you are pregnant or have diabetes, smoking increases your risk for complications.   Benefits to your health if you stop smoking:   You decrease respiratory symptoms such as coughing, wheezing, and shortness of breath.   You reduce your risk for cancers of the lung, mouth, throat, kidney, bladder, pancreas, stomach, and cervix. If you already have cancer, you increase the benefits of chemotherapy. You also reduce your risk for cancer returning or a second cancer from developing.   You reduce your risk for heart disease, blood clots, heart attack, and stroke.   You reduce your risk for lung infections, and diseases such as pneumonia, asthma,  chronic bronchitis, and emphysema.  Your circulation improves. More oxygen can be delivered to your body. If you have diabetes, you lower your risk for complications, such as kidney, artery, and eye diseases. You also lower your risk for nerve damage. Nerve damage can lead to amputations, poor vision, and blindness.  You improve your body's ability to heal and to fight infections.  For more information and support to stop smoking:   [employer]  Phone: 7- 458 - 441-6103  Web Address: www.[employer]  How to Quit Using Smokeless Tobacco   Why it is important to stop using smokeless tobacco:  Smokeless tobacco comes in many forms. Examples include chew, snuff, dip, dissolvable tobacco, and snus. All smokeless tobacco products contain nicotine and may contain as much nicotine as 3 cigarettes. You may be physically dependent on nicotine. You may also be emotionally addicted to it. The cravings can be strong, but it is important to quit using smokeless tobacco. You will improve your health and decrease your cancer, stroke, and heart attack risk. Mouth sores and tooth problems will also improve when you quit. You can benefit from quitting no matter how long you have used smokeless tobacco.   Prepare to stop using smokeless tobacco:  Nicotine is a highly addictive drug. Withdrawal symptoms can happen when you stop and make it hard to quit. The following can help keep you on track:  Set a quit date.    Tell friends, family, and coworkers that you plan to quit.    Remove all smokeless tobacco products from your home, car, and workplace.    Manage weight gain after you quit:  Nicotine can affect your metabolism. You may gain a few pounds after you quit. The following can help you control your weight:  Eat healthy foods.    Drink water before, during, and between meals.    Exercise as directed.         © Copyright [employer] 2018 Information is for End User's use only and may not be sold, redistributed or otherwise  used for commercial purposes. All illustrations and images included in CareNotes® are the copyrighted property of [employer] or [employer]         SUSANA Marques  Depression Screening Follow-up Plan: Patient's depression screening was positive with a PHQ-2 score of . Their PHQ-9 score was 10. Patient assessed for underlying major depression. They have no active suicidal ideations. Brief counseling provided and recommend additional follow-up/re-evaluation next office visit.

## 2024-08-23 NOTE — PATIENT INSTRUCTIONS
Medicare Preventive Visit Patient Instructions  Thank you for completing your Welcome to Medicare Visit or Medicare Annual Wellness Visit today. Your next wellness visit will be due in one year (8/24/2025).  The screening/preventive services that you may require over the next 5-10 years are detailed below. Some tests may not apply to you based off risk factors and/or age. Screening tests ordered at today's visit but not completed yet may show as past due. Also, please note that scanned in results may not display below.  Preventive Screenings:  Service Recommendations Previous Testing/Comments   Colorectal Cancer Screening  * Colonoscopy    * Fecal Occult Blood Test (FOBT)/Fecal Immunochemical Test (FIT)  * Fecal DNA/Cologuard Test  * Flexible Sigmoidoscopy Age: 45-75 years old   Colonoscopy: every 10 years (may be performed more frequently if at higher risk)  OR  FOBT/FIT: every 1 year  OR  Cologuard: every 3 years  OR  Sigmoidoscopy: every 5 years  Screening may be recommended earlier than age 45 if at higher risk for colorectal cancer. Also, an individualized decision between you and your healthcare provider will decide whether screening between the ages of 76-85 would be appropriate. Colonoscopy: Not on file  FOBT/FIT: Not on file  Cologuard: Not on file  Sigmoidoscopy: Not on file          Breast Cancer Screening Age: 40+ years old  Frequency: every 1-2 years  Not required if history of left and right mastectomy Mammogram: Not on file        Cervical Cancer Screening Between the ages of 21-29, pap smear recommended once every 3 years.   Between the ages of 30-65, can perform pap smear with HPV co-testing every 5 years.   Recommendations may differ for women with a history of total hysterectomy, cervical cancer, or abnormal pap smears in past. Pap Smear: Not on file        Hepatitis C Screening Once for adults born between 1945 and 1965  More frequently in patients at high risk for Hepatitis C Hep C Antibody: Not  on file    Screening Current   Diabetes Screening 1-2 times per year if you're at risk for diabetes or have pre-diabetes Fasting glucose: No results in last 5 years (No results in last 5 years)  A1C: No results in last 5 years (No results in last 5 years)  Screening Current   Cholesterol Screening Once every 5 years if you don't have a lipid disorder. May order more often based on risk factors. Lipid panel: Not on file          Other Preventive Screenings Covered by Medicare:  Abdominal Aortic Aneurysm (AAA) Screening: covered once if your at risk. You're considered to be at risk if you have a family history of AAA.  Lung Cancer Screening: covers low dose CT scan once per year if you meet all of the following conditions: (1) Age 55-77; (2) No signs or symptoms of lung cancer; (3) Current smoker or have quit smoking within the last 15 years; (4) You have a tobacco smoking history of at least 20 pack years (packs per day multiplied by number of years you smoked); (5) You get a written order from a healthcare provider.  Glaucoma Screening: covered annually if you're considered high risk: (1) You have diabetes OR (2) Family history of glaucoma OR (3)  aged 50 and older OR (4)  American aged 65 and older  Osteoporosis Screening: covered every 2 years if you meet one of the following conditions: (1) You're estrogen deficient and at risk for osteoporosis based off medical history and other findings; (2) Have a vertebral abnormality; (3) On glucocorticoid therapy for more than 3 months; (4) Have primary hyperparathyroidism; (5) On osteoporosis medications and need to assess response to drug therapy.   Last bone density test (DXA Scan): Not on file.  HIV Screening: covered annually if you're between the age of 15-65. Also covered annually if you are younger than 15 and older than 65 with risk factors for HIV infection. For pregnant patients, it is covered up to 3 times per  pregnancy.    Immunizations:  Immunization Recommendations   Influenza Vaccine Annual influenza vaccination during flu season is recommended for all persons aged >= 6 months who do not have contraindications   Pneumococcal Vaccine   * Pneumococcal conjugate vaccine = PCV13 (Prevnar 13), PCV15 (Vaxneuvance), PCV20 (Prevnar 20)  * Pneumococcal polysaccharide vaccine = PPSV23 (Pneumovax) Adults 19-65 yo with certain risk factors or if 65+ yo  If never received any pneumonia vaccine: recommend Prevnar 20 (PCV20)  Give PCV20 if previously received 1 dose of PCV13 or PPSV23   Hepatitis B Vaccine 3 dose series if at intermediate or high risk (ex: diabetes, end stage renal disease, liver disease)   Respiratory syncytial virus (RSV) Vaccine - COVERED BY MEDICARE PART D  * RSVPreF3 (Arexvy) CDC recommends that adults 60 years of age and older may receive a single dose of RSV vaccine using shared clinical decision-making (SCDM)   Tetanus (Td) Vaccine - COST NOT COVERED BY MEDICARE PART B Following completion of primary series, a booster dose should be given every 10 years to maintain immunity against tetanus. Td may also be given as tetanus wound prophylaxis.   Tdap Vaccine - COST NOT COVERED BY MEDICARE PART B Recommended at least once for all adults. For pregnant patients, recommended with each pregnancy.   Shingles Vaccine (Shingrix) - COST NOT COVERED BY MEDICARE PART B  2 shot series recommended in those 19 years and older who have or will have weakened immune systems or those 50 years and older     Health Maintenance Due:      Topic Date Due   • HIV Screening  Never done   • Cervical Cancer Screening  Never done   • Breast Cancer Screening: Mammogram  Never done   • Colorectal Cancer Screening  Never done   • Hepatitis C Screening  Completed     Immunizations Due:      Topic Date Due   • Pneumococcal Vaccine: Pediatrics (0 to 5 Years) and At-Risk Patients (6 to 64 Years) (1 of 2 - PCV) Never done   • Hepatitis A Vaccine  (1 of 2 - Risk 2-dose series) Never done   • COVID-19 Vaccine (3 - 2023-24 season) 09/01/2023   • Influenza Vaccine (1) 09/01/2024     Advance Directives   What are advance directives?  Advance directives are legal documents that state your wishes and plans for medical care. These plans are made ahead of time in case you lose your ability to make decisions for yourself. Advance directives can apply to any medical decision, such as the treatments you want, and if you want to donate organs.   What are the types of advance directives?  There are many types of advance directives, and each state has rules about how to use them. You may choose a combination of any of the following:  Living will:  This is a written record of the treatment you want. You can also choose which treatments you do not want, which to limit, and which to stop at a certain time. This includes surgery, medicine, IV fluid, and tube feedings.   Durable power of  for healthcare (DPAHC):  This is a written record that states who you want to make healthcare choices for you when you are unable to make them for yourself. This person, called a proxy, is usually a family member or a friend. You may choose more than 1 proxy.  Do not resuscitate (DNR) order:  A DNR order is used in case your heart stops beating or you stop breathing. It is a request not to have certain forms of treatment, such as CPR. A DNR order may be included in other types of advance directives.  Medical directive:  This covers the care that you want if you are in a coma, near death, or unable to make decisions for yourself. You can list the treatments you want for each condition. Treatment may include pain medicine, surgery, blood transfusions, dialysis, IV or tube feedings, and a ventilator (breathing machine).  Values history:  This document has questions about your views, beliefs, and how you feel and think about life. This information can help others choose the care that you  would choose.  Why are advance directives important?  An advance directive helps you control your care. Although spoken wishes may be used, it is better to have your wishes written down. Spoken wishes can be misunderstood, or not followed. Treatments may be given even if you do not want them. An advance directive may make it easier for your family to make difficult choices about your care.   Cigarette Smoking and Your Health   Risks to your health if you smoke:  Nicotine and other chemicals found in tobacco damage every cell in your body. Even if you are a light smoker, you have an increased risk for cancer, heart disease, and lung disease. If you are pregnant or have diabetes, smoking increases your risk for complications.   Benefits to your health if you stop smoking:   You decrease respiratory symptoms such as coughing, wheezing, and shortness of breath.   You reduce your risk for cancers of the lung, mouth, throat, kidney, bladder, pancreas, stomach, and cervix. If you already have cancer, you increase the benefits of chemotherapy. You also reduce your risk for cancer returning or a second cancer from developing.   You reduce your risk for heart disease, blood clots, heart attack, and stroke.   You reduce your risk for lung infections, and diseases such as pneumonia, asthma, chronic bronchitis, and emphysema.  Your circulation improves. More oxygen can be delivered to your body. If you have diabetes, you lower your risk for complications, such as kidney, artery, and eye diseases. You also lower your risk for nerve damage. Nerve damage can lead to amputations, poor vision, and blindness.  You improve your body's ability to heal and to fight infections.  For more information and support to stop smoking:   [employer]  Phone: 6- 778 - 121-0602  Web Address: www.[employer]  How to Quit Using Smokeless Tobacco   Why it is important to stop using smokeless tobacco:  Smokeless tobacco comes in many forms. Examples  include chew, snuff, dip, dissolvable tobacco, and snus. All smokeless tobacco products contain nicotine and may contain as much nicotine as 3 cigarettes. You may be physically dependent on nicotine. You may also be emotionally addicted to it. The cravings can be strong, but it is important to quit using smokeless tobacco. You will improve your health and decrease your cancer, stroke, and heart attack risk. Mouth sores and tooth problems will also improve when you quit. You can benefit from quitting no matter how long you have used smokeless tobacco.   Prepare to stop using smokeless tobacco:  Nicotine is a highly addictive drug. Withdrawal symptoms can happen when you stop and make it hard to quit. The following can help keep you on track:  Set a quit date.    Tell friends, family, and coworkers that you plan to quit.    Remove all smokeless tobacco products from your home, car, and workplace.    Manage weight gain after you quit:  Nicotine can affect your metabolism. You may gain a few pounds after you quit. The following can help you control your weight:  Eat healthy foods.    Drink water before, during, and between meals.    Exercise as directed.         © Copyright [employer] 2018 Information is for End User's use only and may not be sold, redistributed or otherwise used for commercial purposes. All illustrations and images included in CareNotes® are the copyrighted property of [employer] Inc. or [employer]

## 2024-08-23 NOTE — ASSESSMENT & PLAN NOTE
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   Right 4th toe is dusky, but not open wounds.  ELISABETH's R 0.67 L 0.63  12/19/24-12/23/24 Hospital admission   12/21/24 OR  Aortogram, RLE arteriogram with balloon angioplasty and stenting   01/05/24 Vascular   Patent right SFA stent without evidence of stenosis.   Pain that is improving daily, most likely related to revascularization of her RLE.   Follow up in 6 months with a repeat RLE duplex and LEADs.   01/25/24 - 02/04/24 Hospital admission   R 4th toe pain  Gangrene of the R 4th toe and possible cellulitis.  MRI Toe   Fourth toe soft tissue swelling, nonspecific edema in the middle phalanx of the fourth toe favored to represent reactive osteitis, early findings of OM are difficult to exclude.   Vascular surgery and podiatry consulted, patient opted for R BKA on antibiotics IV ancef/vancomycin  01/30/24 R BKA   Declined rehab  Discharged home   03/04/24 Vascular   Post op evaluation of right BKA.  Phantom limb pain.   Wearing ampu shield and washing stump daily.

## 2024-08-23 NOTE — ASSESSMENT & PLAN NOTE
Skin breakdown on buttock from prolonged sitting   Encouraged changing positions frequently   Zinc paste prescribed.   No open areas

## 2024-08-23 NOTE — ASSESSMENT & PLAN NOTE
PHQ9 Score 10  Emotional support provided  Denies suicidal ideations or thoughts  Current medications: Zoloft 100mg, Buspar 5mg BID, Hydroxyzine 10mg PRN   New exacerbation of symptoms since R BKA having symptoms of PTSD   New medication: Zoloft 100mg BID, Buspar 7.5mg BID, Hydroxyzine 10mg PRN   Discussed lifestyle modifications and relaxation methods   Referral to behavioral health placed today   Patient aware of extended wait times

## 2024-08-26 ENCOUNTER — TELEPHONE (OUTPATIENT)
Age: 64
End: 2024-08-26

## 2024-08-26 NOTE — TELEPHONE ENCOUNTER
Patient called stating she would like to schedule an appointment for talk therapy. Patient has a referral and has been added to proper wait list.   
no

## 2024-10-12 DIAGNOSIS — F41.9 ANXIETY AND DEPRESSION: ICD-10-CM

## 2024-10-12 DIAGNOSIS — F32.A ANXIETY AND DEPRESSION: ICD-10-CM

## 2024-10-13 RX ORDER — BUSPIRONE HYDROCHLORIDE 7.5 MG/1
7.5 TABLET ORAL 2 TIMES DAILY
Qty: 180 TABLET | Refills: 0 | Status: SHIPPED | OUTPATIENT
Start: 2024-10-13

## 2024-11-22 ENCOUNTER — OFFICE VISIT (OUTPATIENT)
Dept: FAMILY MEDICINE CLINIC | Facility: CLINIC | Age: 64
End: 2024-11-22
Payer: COMMERCIAL

## 2024-11-22 VITALS
BODY MASS INDEX: 19.62 KG/M2 | SYSTOLIC BLOOD PRESSURE: 130 MMHG | DIASTOLIC BLOOD PRESSURE: 80 MMHG | TEMPERATURE: 94.7 F | OXYGEN SATURATION: 97 % | WEIGHT: 125 LBS | HEIGHT: 67 IN | HEART RATE: 74 BPM

## 2024-11-22 DIAGNOSIS — F32.A ANXIETY AND DEPRESSION: Primary | ICD-10-CM

## 2024-11-22 DIAGNOSIS — I73.9 PAD (PERIPHERAL ARTERY DISEASE) (HCC): ICD-10-CM

## 2024-11-22 DIAGNOSIS — F41.9 ANXIETY AND DEPRESSION: Primary | ICD-10-CM

## 2024-11-22 DIAGNOSIS — R23.8 SKIN BREAKDOWN: ICD-10-CM

## 2024-11-22 PROCEDURE — 99214 OFFICE O/P EST MOD 30 MIN: CPT

## 2024-11-22 RX ORDER — ARIPIPRAZOLE 2 MG/1
2 TABLET ORAL DAILY
Qty: 30 TABLET | Refills: 0 | Status: SHIPPED | OUTPATIENT
Start: 2024-11-22

## 2024-11-22 NOTE — PROGRESS NOTES
Name: Collette Vaughn      : 1960      MRN: [id number]  Encounter Provider: SUSANA Marques  Encounter Date: 2024   Encounter department: [facility]  :  Assessment & Plan  Anxiety and depression  PHQ9 Score 10  Emotional support provided  Denies suicidal ideations or thoughts  Current medications: Zoloft 100mg BID, Buspar 7.5mg BID, Hydroxyzine 10mg PRN   New exacerbation of symptoms since R BKA having symptoms of PTSD   New medication: Aripiprazole 2mg, Zoloft 100mg BID, Buspar 7.5mg BID, Hydroxyzine 10mg PRN   Discussed lifestyle modifications and relaxation methods   Referral to behavioral health placed today   Patient aware of extended wait times        Skin breakdown  Skin breakdown on buttock from prolonged sitting   Encouraged changing positions frequently   Zinc paste prescribed.   Now has open areas on the buttocks since previous visit   Wound care referral placed        PAD (peripheral artery disease) (HCC)  Current medications: Eliquis 5mg and Atorvastatin 40mg     Followed by vascular surgery    Ecchymotic 1st and 5th toe since  Abdominal aortogram with femoral runoff, left  Findings: LLE proximal SFA occlusion to popliteal artery, will require a bypass in the near future   23 Vascular    Ischemic rest pain in the right foot and toes.   Right 4th toe is dusky, but not open wounds.  ELISABETH's R 0.67 L 0.63  24-24 Hospital admission   24 OR  Aortogram, RLE arteriogram with balloon angioplasty and stenting   24 Vascular   Patent right SFA stent without evidence of stenosis.   Pain that is improving daily, most likely related to revascularization of her RLE.   Follow up in 6 months with a repeat RLE duplex and LEADs.   24 - 24 Hospital admission   R 4th toe pain  Gangrene of the R 4th toe and possible cellulitis.  MRI Toe   Fourth toe soft tissue swelling, nonspecific edema in the middle phalanx of the  "fourth toe favored to represent reactive osteitis, early findings of OM are difficult to exclude.   Vascular surgery and podiatry consulted, patient opted for R BKA on antibiotics IV ancef/vancomycin  01/30/24 R BKA   Declined rehab  Discharged home   03/04/24 Vascular   Post op evaluation of right BKA.  Phantom limb pain.   Wearing ampu shield and washing stump daily.              History of Present Illness     Follow up on chronic conditions       Review of Systems   Constitutional:  Negative for activity change, chills, fatigue and fever.   HENT:  Negative for congestion, ear pain, rhinorrhea, sore throat and trouble swallowing.    Eyes:  Negative for pain and visual disturbance.   Respiratory:  Negative for cough, chest tightness and shortness of breath.    Cardiovascular:  Negative for chest pain, palpitations and leg swelling.   Gastrointestinal:  Negative for abdominal pain, constipation, diarrhea, nausea and vomiting.   Genitourinary:  Negative for difficulty urinating, dysuria, hematuria and urgency.   Musculoskeletal:  Negative for arthralgias and back pain.   Skin:  Negative for color change and rash.   Neurological:  Negative for dizziness, seizures, syncope and headaches.   Psychiatric/Behavioral:  Negative for dysphoric mood. The patient is not nervous/anxious.    All other systems reviewed and are negative.    Medical History Reviewed by provider this encounter:     .     Objective   /80 (BP Location: Left arm, Patient Position: Sitting, Cuff Size: Standard)   Pulse 74   Temp (!) 94.7 °F (34.8 °C) (Temporal)   Ht 5' 7\" (1.702 m)   Wt 56.7 kg (125 lb)   SpO2 97%   BMI 19.58 kg/m²      Physical Exam  Vitals and nursing note reviewed.   Constitutional:       General: She is not in acute distress.     Appearance: Normal appearance. She is well-developed and normal weight.   HENT:      Head: Normocephalic and atraumatic.      Right Ear: Tympanic membrane, ear canal and external ear normal. " There is no impacted cerumen.      Left Ear: Tympanic membrane, ear canal and external ear normal. There is no impacted cerumen.      Nose: Nose normal.      Mouth/Throat:      Mouth: Mucous membranes are moist.      Pharynx: Oropharynx is clear.   Eyes:      Extraocular Movements: Extraocular movements intact.      Conjunctiva/sclera: Conjunctivae normal.      Pupils: Pupils are equal, round, and reactive to light.   Cardiovascular:      Rate and Rhythm: Normal rate and regular rhythm.      Pulses: Normal pulses.      Heart sounds: Normal heart sounds. No murmur heard.  Pulmonary:      Effort: Pulmonary effort is normal. No respiratory distress.      Breath sounds: Normal breath sounds.   Abdominal:      General: Bowel sounds are normal.      Palpations: Abdomen is soft.      Tenderness: There is no abdominal tenderness.   Musculoskeletal:         General: Normal range of motion.      Cervical back: Normal range of motion and neck supple.      Right lower leg: No edema.      Left lower leg: No edema.   Skin:     General: Skin is warm and dry.      Capillary Refill: Capillary refill takes less than 2 seconds.   Neurological:      General: No focal deficit present.      Mental Status: She is alert and oriented to person, place, and time. Mental status is at baseline.   Psychiatric:         Mood and Affect: Mood normal.         Behavior: Behavior normal.         Thought Content: Thought content normal.         Judgment: Judgment normal.       Administrative Statements   I have spent a total time of 40 minutes in caring for this patient on the day of the visit/encounter including Diagnostic results, Prognosis, Risks and benefits of tx options, Instructions for management, Patient and family education, Importance of tx compliance, Risk factor reductions, Impressions, Counseling / Coordination of care, Documenting in the medical record, Reviewing / ordering tests, medicine, procedures  , and Obtaining or reviewing  history  .     Patient Instructions   Patient Education     Aripiprazole (ay ri PIP ray zole)   Brand Names: US Abilify; Abilify Asimtufii; Abilify Maintena; Abilify MyCite Maintenance Kit; Abilify MyCite Starter Kit; Abilify MyCite [DSC]   Brand Names: Reba Abilify; Abilify Maintena; APO-ARIPiprazole; Auro-ARIPiprazole; MINT-Aripiprazole; NRA-Aripiprazole; PMS-ARIPiprazole; CHRIS-ARIPiprazole; SANDOZ ARIPiprazole; TEVA-ARIPiprazole [DSC]   Warning   There is a higher chance of death in older adults who take this drug for mental problems caused by dementia. Most of the deaths were linked to heart disease or infection. This drug is not approved to treat mental problems caused by dementia.  Drugs like this one have raised the chance of suicidal thoughts or actions in children and young adults. The risk may be greater in people who have had these thoughts or actions in the past. All people who take this drug need to be watched closely. Call the doctor right away if signs like depression, nervousness, restlessness, grouchiness, panic attacks, or changes in mood or actions are new or worse. Call the doctor right away if any thoughts or actions of suicide occur.  What is this drug used for?   It is used to treat schizophrenia.  It is used to treat bipolar disorder.  It is used to treat depression.  It is used to treat certain behavior problems in patients with autism.  It is used to treat Tourette's syndrome.  It may be given to you for other reasons. Talk with the doctor.  What do I need to tell my doctor BEFORE I take this drug?   If you are allergic to this drug; any part of this drug; or any other drugs, foods, or substances. Tell your doctor about the allergy and what signs you had.  This drug may interact with other drugs or health problems.  Tell your doctor and pharmacist about all of your drugs (prescription or OTC, natural products, vitamins) and health problems. You must check to make sure that it is safe for  you to take this drug with all of your drugs and health problems. Do not start, stop, or change the dose of any drug without checking with your doctor.  What are some things I need to know or do while I take this drug?   All products:   Tell all of your health care providers that you take this drug. This includes your doctors, nurses, pharmacists, and dentists.  Avoid driving and doing other tasks or actions that call for you to be alert or have clear eyesight until you see how this drug affects you.  To lower the chance of feeling dizzy or passing out, rise slowly if you have been sitting or lying down. Be careful going up and down stairs.  If you have phenylketonuria (PKU), talk with your doctor. Some products have phenylalanine.  If you are not able to break down sucrose or fructose, talk with your doctor. Some products have sucrose or fructose.  Have blood work checked as you have been told by the doctor. Talk with the doctor.  Avoid drinking alcohol while taking this drug.  Talk with your doctor before you use marijuana, other forms of cannabis, or prescription or OTC drugs that may slow your actions.  Be careful in hot weather or while being active. Drink lots of fluids to stop fluid loss.  Drink lots of noncaffeine liquids every day unless told to drink less liquid by your doctor.  Chance of seizures may be higher. Talk with the doctor.  Low white blood cell counts have happened with drugs like this one. This may lead to a higher chance of infection. Rarely, infections have been deadly. Tell your doctor if you have ever had a low white blood cell count. Call your doctor right away if you have signs of infection like fever, chills, or sore throat.  High blood sugar or diabetes, high cholesterol, and weight gain have happened with drugs like this one. These may raise the chance of heart and brain blood vessel disease.  Check your blood sugar as you have been told by your doctor.  Dizziness, sleepiness, and  feeling less stable may happen with this drug. These may lead to falling, which can cause broken bones or other health problems.  Older adults with dementia taking drugs like this one have had a higher number of strokes. Sometimes these have been deadly. This drug is not approved to treat mental problems caused by dementia.  If you are 65 or older, use this drug with care. You could have more side effects.  Tell your doctor if you are pregnant, plan on getting pregnant, or are breast-feeding. You will need to talk about the benefits and risks to you and the baby.  Taking this drug in the third trimester of pregnancy may lead to side effects or withdrawal in the .  Tablets with sensor (Abilify MyCite):   The patch may have metal. Take off the patch before an MRI.  What are some side effects that I need to call my doctor about right away?   WARNING/CAUTION: Even though it may be rare, some people may have very bad and sometimes deadly side effects when taking a drug. Tell your doctor or get medical help right away if you have any of the following signs or symptoms that may be related to a very bad side effect:  All products:   Signs of an allergic reaction, like rash; hives; itching; red, swollen, blistered, or peeling skin with or without fever; wheezing; tightness in the chest or throat; trouble breathing, swallowing, or talking; unusual hoarseness; or swelling of the mouth, face, lips, tongue, or throat.  Signs of high blood sugar like confusion, feeling sleepy, unusual thirst or hunger, passing urine more often, flushing, fast breathing, or breath that smells like fruit.  Trouble controlling body movements, twitching, change in balance, trouble swallowing or speaking.  Strong urges that are hard to control (such as eating, gambling, sex, or spending money).  Very bad dizziness or passing out.  Change in balance.  Seizures.  Blurred eyesight.  A very bad and sometimes deadly health problem called neuroleptic  malignant syndrome (NMS) may happen. Call your doctor right away if you have any fever, muscle cramps or stiffness, dizziness, very bad headache, confusion, change in thinking, fast heartbeat, heartbeat that does not feel normal, or are sweating a lot.  Some people may get a severe muscle problem called tardive dyskinesia. This problem may lessen or go away after stopping this drug, but it may not go away. The risk is greater with diabetes and in older adults, especially older females. The risk is greater with longer use or higher doses, but it may also occur after short-term use with low doses. Call your doctor right away if you have trouble controlling body movements or problems with your tongue, face, mouth, or jaw like tongue sticking out, puffing cheeks, mouth puckering, or chewing.  Tablets with sensor (Abilify MyCite):   Skin irritation where the patch was placed.  What are some other side effects of this drug?   All drugs may cause side effects. However, many people have no side effects or only have minor side effects. Call your doctor or get medical help if any of these side effects or any other side effects bother you or do not go away:  All products:   Feeling dizzy, sleepy, tired, or weak.  Restlessness.  Anxiety.  Headache.  Upset stomach or throwing up.  Weight gain.  Constipation.  Trouble sleeping.  Appetite changes.  Nose or throat irritation.  Dry mouth.  Shakiness.  Drooling.  Stuffy nose.  Injection:   Pain where the shot was given.  These are not all of the side effects that may occur. If you have questions about side effects, call your doctor. Call your doctor for medical advice about side effects.  You may report side effects to your national health agency.  You may report side effects to the FDA at 1-201.795.4332. You may also report side effects at https://www.fda.gov/medwatch.  How is this drug best taken?   Use this drug as ordered by your doctor. Read all information given to you. Follow  all instructions closely.  All oral products:   Take with or without food.  Keep taking this drug as you have been told by your doctor or other health care provider, even if you feel well.  Tablets:   Swallow whole. Do not chew, break, or crush.  Liquid:   Measure liquid doses carefully. Use the measuring device that comes with this drug. If there is none, ask the pharmacist for a device to measure this drug.  Oral-disintegrating tablet:   Do not push the tablet out of the foil when opening. Use dry hands to take it from the foil. Place on your tongue and let it dissolve. Water is not needed. Do not swallow it whole. Do not chew, break, or crush it.  Tablets with sensor (Abilify MyCite):   While you are taking this drug, you will also wear a patch with a sensor that tracks when you take one of the tablets. You will also need to use a smartphone cheyanne. Be sure you know how to take the tablets, where to put the patch, other details about the patch, and how to use the smartphone cheyanne. Follow what your doctor has told you to do. Read the package insert for details. If you have questions, talk with your doctor.  Swallow tablet whole. Do not chew, break, or crush.  Injection:   It is given as a shot into a muscle.  What do I do if I miss a dose?   All oral products:   Take a missed dose as soon as you think about it.  If it is close to the time for your next dose, skip the missed dose and go back to your normal time.  Do not take 2 doses at the same time or extra doses.  Injection:   Call your doctor to find out what to do.  How do I store and/or throw out this drug?   All oral products:   Store at room temperature in a dry place. Do not store in a bathroom.  Liquid:   After opening, throw away any part not used after 6 months.  Oral-disintegrating tablet:   Use oral-disintegrating tablet right after opening. Throw away any part of opened pouch that is not used.  Injection:   If you need to store this drug at home, talk with  your doctor, nurse, or pharmacist about how to store it.  All products:   Keep all drugs in a safe place. Keep all drugs out of the reach of children and pets.  Throw away unused or  drugs. Do not flush down a toilet or pour down a drain unless you are told to do so. Check with your pharmacist if you have questions about the best way to throw out drugs. There may be drug take-back programs in your area.  General drug facts   If your symptoms or health problems do not get better or if they become worse, call your doctor.  Do not share your drugs with others and do not take anyone else's drugs.  Some drugs may have another patient information leaflet. If you have any questions about this drug, please talk with your doctor, nurse, pharmacist, or other health care provider.  This drug comes with an extra patient fact sheet called a Medication Guide. Read it with care. Read it again each time this drug is refilled. If you have any questions about this drug, please talk with the doctor, pharmacist, or other health care provider.  If you think there has been an overdose, call your poison control center or get medical care right away. Be ready to tell or show what was taken, how much, and when it happened.  Consumer Information Use and Disclaimer   This generalized information is a limited summary of diagnosis, treatment, and/or medication information. It is not meant to be comprehensive and should be used as a tool to help the user understand and/or assess potential diagnostic and treatment options. It does NOT include all information about conditions, treatments, medications, side effects, or risks that may apply to a specific patient. It is not intended to be medical advice or a substitute for the medical advice, diagnosis, or treatment of a health care provider based on the health care provider's examination and assessment of a patient's specific and unique circumstances. Patients must speak with a health care  "provider for complete information about their health, medical questions, and treatment options, including any risks or benefits regarding use of medications. This information does not endorse any treatments or medications as safe, effective, or approved for treating a specific patient. UpToDate, Inc. and its affiliates disclaim any warranty or liability relating to this information or the use thereof. The use of this information is governed by the Terms of Use, available at https://www.[employer].com/en/know/clinical-effectiveness-terms.  Last Reviewed Date   2022-12-13  Copyright   © 2024 UpToDate, Inc. and its affiliates and/or licensors. All rights reserved.  Patient Education     Pressure sores   The Basics   Written by the doctors and editors at [employer]   What are pressure sores? -- Pressures sores are areas of damage to your skin and the tissue under your skin caused by pressure. This can happen from sitting or lying in the same position for a very long time. They are also called \"pressure ulcers\" or \"bedsores.\" Doctors often call them \"pressure-induced injuries.\"  The most common places for pressure sores are where bone is close to the skin. These areas include the ankles, back, elbows, heels, and hips.  Pressure sores can also happen where something touches or rubs the skin for a long time. For example, casts, bandages, or oxygen masks can lead to sores.  What does a pressure sore look like? -- At first, the sore looks like a discolored patch of skin. For example, the skin might look red, purple, or blue. If it gets worse, the skin breaks down and the sore looks like a reddish-pink, shallow crater (picture 1). If the skin breaks down, it can become infected.  Some sores look like a blister and can burst. Some severe pressure sores are so deep, you can see muscle or bone. Deep sores can also form scabs.  Who is at risk of pressure sores? -- Pressure sores are most common in people who:   Are older   " "Cannot move or shift around easily because of a medical problem   Are in a hospital or nursing home  Can pressure sores be prevented? -- Yes. There are a few things that you can do to help prevent pressure sores.  If you spend a lot of time in a bed, you can lower your chances of getting pressure sores if you:   Turn your body at least every 2 hours - For example, turn from your back to one side, then the other side.   Lie with your upper body raised at a slight angle when you are on your side - This puts less pressure on your hip bone.   Place pillows or foam wedges between your ankles and knees - You can also place these under your ankles.  Using a special mattress or a pad that goes on top of your mattress might also help. These mattresses or pads can be made of gel, foam, air, or water. There are also special devices that automatically adjust the surface to help relieve pressure when you lie down.  If you spend a lot of time in a chair or use a wheelchair, you can lower your chances of pressure sores if you:   Do chair \"pushups\" - Use your arms to raise yourself off of the seat, then lower. Try to do this at least once every hour.   Tilt your body forward or to the side so that your bottom lifts off of the seat - Do this at least once every hour.   Use a special seat cushion - Cushions can be filled with air, gel, or foam.  Taking good care of your skin is also important for preventing pressure sores. To help keep your skin as healthy as possible, you should:   Check your skin regularly for signs of pressure sores - Have another person check areas that you cannot see or feel. (If you have nerve damage, you might not be able to feel a pressure sore forming.)   Clean your skin regularly with mild soap - Do not use hot water. Gently pat the skin dry instead of rubbing with a towel.   Use a moisturizing cream - This will help keep your skin from getting dry and flaky.   Use absorbent pads or underwear if you have " problems with incontinence - Incontinence means leaking urine or bowel movements. Using special pads or underwear can help keep your skin dry. Change them as soon as possible if they become soiled.  Should I see a doctor or nurse? -- See your doctor or nurse right away if you see or feel any areas where the surface of the skin is broken.  How are pressure sores treated? -- Treatment can include:   Medicines to treat pain   Medicines to treat infection, if your skin is infected   Special bandages to help the pressure sores heal   Surgery or other treatments to remove tissue - If there is infected or dead tissue in the pressure sore, this might need to be removed. But not everyone needs this.  Your doctor or nurse will also talk to you about any problems that might have caused your pressure sore. They might suggest changes to help prevent you from getting pressure sores in the future.  What problems should I watch for? -- Call for advice if:   You get a new pressure sore, or an existing sore gets worse.   You have signs of infection - These include:   Fever of 100.4°F (38°C) or higher, or chills   Swelling, color changes, or warmth around the sore   Yellow, green, or bloody discharge from the sore   Bad smell coming from the sore  All topics are updated as new evidence becomes available and our peer review process is complete.  This topic retrieved from [employer] on: Feb 26, 2024.  Topic 02702 Version 13.0  Release: 32.2.4 - C32.56  © 2024 UpToDate, Inc. and/or its affiliates. All rights reserved.  picture 1: Pressure sore     This picture shows a pressure sore. This can happen from sitting or lying in the same position for a very long time.  Graphic 034221 Version 2.0  Consumer Information Use and Disclaimer   Disclaimer: This generalized information is a limited summary of diagnosis, treatment, and/or medication information. It is not meant to be comprehensive and should be used as a tool to help the user understand  and/or assess potential diagnostic and treatment options. It does NOT include all information about conditions, treatments, medications, side effects, or risks that may apply to a specific patient. It is not intended to be medical advice or a substitute for the medical advice, diagnosis, or treatment of a health care provider based on the health care provider's examination and assessment of a patient's specific and unique circumstances. Patients must speak with a health care provider for complete information about their health, medical questions, and treatment options, including any risks or benefits regarding use of medications. This information does not endorse any treatments or medications as safe, effective, or approved for treating a specific patient. UpTo[employer] Inc[employer] and its affiliates disclaim any warranty or liability relating to this information or the use thereof.The use of this information is governed by the Terms of Use, available at https://www.[employer].com/en/know/clinical-effectiveness-terms. 2024© UpToDate, Inc. and its affiliates and/or licensors. All rights reserved.  Copyright   © 2024 UpToDate, Inc. and/or its affiliates. All rights reserved.

## 2024-11-22 NOTE — ASSESSMENT & PLAN NOTE
Skin breakdown on buttock from prolonged sitting   Encouraged changing positions frequently   Zinc paste prescribed.   Now has open areas on the buttocks since previous visit   Wound care referral placed

## 2024-11-22 NOTE — ASSESSMENT & PLAN NOTE
PHQ9 Score 10  Emotional support provided  Denies suicidal ideations or thoughts  Current medications: Zoloft 100mg BID, Buspar 7.5mg BID, Hydroxyzine 10mg PRN   New exacerbation of symptoms since R BKA having symptoms of PTSD   New medication: Aripiprazole 2mg, Zoloft 100mg BID, Buspar 7.5mg BID, Hydroxyzine 10mg PRN   Discussed lifestyle modifications and relaxation methods   Referral to behavioral health placed today   Patient aware of extended wait times

## 2024-11-25 NOTE — PATIENT INSTRUCTIONS
Patient Education     Aripiprazole (ay ri PIP ray zole)   Brand Names: US Abilify; Abilify Asimtufii; Abilify Maintena; Abilify MyCite Maintenance Kit; Abilify MyCite Starter Kit; Abilify MyCite [DSC]   Brand Names: Reba Abilify; Abilify Maintena; APO-ARIPiprazole; Auro-ARIPiprazole; MINT-Aripiprazole; NRA-Aripiprazole; PMS-ARIPiprazole; CHRIS-ARIPiprazole; SANDOZ ARIPiprazole; TEVA-ARIPiprazole [DSC]   Warning   There is a higher chance of death in older adults who take this drug for mental problems caused by dementia. Most of the deaths were linked to heart disease or infection. This drug is not approved to treat mental problems caused by dementia.  Drugs like this one have raised the chance of suicidal thoughts or actions in children and young adults. The risk may be greater in people who have had these thoughts or actions in the past. All people who take this drug need to be watched closely. Call the doctor right away if signs like depression, nervousness, restlessness, grouchiness, panic attacks, or changes in mood or actions are new or worse. Call the doctor right away if any thoughts or actions of suicide occur.  What is this drug used for?   It is used to treat schizophrenia.  It is used to treat bipolar disorder.  It is used to treat depression.  It is used to treat certain behavior problems in patients with autism.  It is used to treat Tourette's syndrome.  It may be given to you for other reasons. Talk with the doctor.  What do I need to tell my doctor BEFORE I take this drug?   If you are allergic to this drug; any part of this drug; or any other drugs, foods, or substances. Tell your doctor about the allergy and what signs you had.  This drug may interact with other drugs or health problems.  Tell your doctor and pharmacist about all of your drugs (prescription or OTC, natural products, vitamins) and health problems. You must check to make sure that it is safe for you to take this drug with all of your  drugs and health problems. Do not start, stop, or change the dose of any drug without checking with your doctor.  What are some things I need to know or do while I take this drug?   All products:   Tell all of your health care providers that you take this drug. This includes your doctors, nurses, pharmacists, and dentists.  Avoid driving and doing other tasks or actions that call for you to be alert or have clear eyesight until you see how this drug affects you.  To lower the chance of feeling dizzy or passing out, rise slowly if you have been sitting or lying down. Be careful going up and down stairs.  If you have phenylketonuria (PKU), talk with your doctor. Some products have phenylalanine.  If you are not able to break down sucrose or fructose, talk with your doctor. Some products have sucrose or fructose.  Have blood work checked as you have been told by the doctor. Talk with the doctor.  Avoid drinking alcohol while taking this drug.  Talk with your doctor before you use marijuana, other forms of cannabis, or prescription or OTC drugs that may slow your actions.  Be careful in hot weather or while being active. Drink lots of fluids to stop fluid loss.  Drink lots of noncaffeine liquids every day unless told to drink less liquid by your doctor.  Chance of seizures may be higher. Talk with the doctor.  Low white blood cell counts have happened with drugs like this one. This may lead to a higher chance of infection. Rarely, infections have been deadly. Tell your doctor if you have ever had a low white blood cell count. Call your doctor right away if you have signs of infection like fever, chills, or sore throat.  High blood sugar or diabetes, high cholesterol, and weight gain have happened with drugs like this one. These may raise the chance of heart and brain blood vessel disease.  Check your blood sugar as you have been told by your doctor.  Dizziness, sleepiness, and feeling less stable may happen with this  drug. These may lead to falling, which can cause broken bones or other health problems.  Older adults with dementia taking drugs like this one have had a higher number of strokes. Sometimes these have been deadly. This drug is not approved to treat mental problems caused by dementia.  If you are 65 or older, use this drug with care. You could have more side effects.  Tell your doctor if you are pregnant, plan on getting pregnant, or are breast-feeding. You will need to talk about the benefits and risks to you and the baby.  Taking this drug in the third trimester of pregnancy may lead to side effects or withdrawal in the .  Tablets with sensor (Abilify MyCite):   The patch may have metal. Take off the patch before an MRI.  What are some side effects that I need to call my doctor about right away?   WARNING/CAUTION: Even though it may be rare, some people may have very bad and sometimes deadly side effects when taking a drug. Tell your doctor or get medical help right away if you have any of the following signs or symptoms that may be related to a very bad side effect:  All products:   Signs of an allergic reaction, like rash; hives; itching; red, swollen, blistered, or peeling skin with or without fever; wheezing; tightness in the chest or throat; trouble breathing, swallowing, or talking; unusual hoarseness; or swelling of the mouth, face, lips, tongue, or throat.  Signs of high blood sugar like confusion, feeling sleepy, unusual thirst or hunger, passing urine more often, flushing, fast breathing, or breath that smells like fruit.  Trouble controlling body movements, twitching, change in balance, trouble swallowing or speaking.  Strong urges that are hard to control (such as eating, gambling, sex, or spending money).  Very bad dizziness or passing out.  Change in balance.  Seizures.  Blurred eyesight.  A very bad and sometimes deadly health problem called neuroleptic malignant syndrome (NMS) may happen.  Call your doctor right away if you have any fever, muscle cramps or stiffness, dizziness, very bad headache, confusion, change in thinking, fast heartbeat, heartbeat that does not feel normal, or are sweating a lot.  Some people may get a severe muscle problem called tardive dyskinesia. This problem may lessen or go away after stopping this drug, but it may not go away. The risk is greater with diabetes and in older adults, especially older females. The risk is greater with longer use or higher doses, but it may also occur after short-term use with low doses. Call your doctor right away if you have trouble controlling body movements or problems with your tongue, face, mouth, or jaw like tongue sticking out, puffing cheeks, mouth puckering, or chewing.  Tablets with sensor (Abilify MyCite):   Skin irritation where the patch was placed.  What are some other side effects of this drug?   All drugs may cause side effects. However, many people have no side effects or only have minor side effects. Call your doctor or get medical help if any of these side effects or any other side effects bother you or do not go away:  All products:   Feeling dizzy, sleepy, tired, or weak.  Restlessness.  Anxiety.  Headache.  Upset stomach or throwing up.  Weight gain.  Constipation.  Trouble sleeping.  Appetite changes.  Nose or throat irritation.  Dry mouth.  Shakiness.  Drooling.  Stuffy nose.  Injection:   Pain where the shot was given.  These are not all of the side effects that may occur. If you have questions about side effects, call your doctor. Call your doctor for medical advice about side effects.  You may report side effects to your national health agency.  You may report side effects to the FDA at 1-604.902.4327. You may also report side effects at https://www.fda.gov/medwatch.  How is this drug best taken?   Use this drug as ordered by your doctor. Read all information given to you. Follow all instructions closely.  All oral  products:   Take with or without food.  Keep taking this drug as you have been told by your doctor or other health care provider, even if you feel well.  Tablets:   Swallow whole. Do not chew, break, or crush.  Liquid:   Measure liquid doses carefully. Use the measuring device that comes with this drug. If there is none, ask the pharmacist for a device to measure this drug.  Oral-disintegrating tablet:   Do not push the tablet out of the foil when opening. Use dry hands to take it from the foil. Place on your tongue and let it dissolve. Water is not needed. Do not swallow it whole. Do not chew, break, or crush it.  Tablets with sensor (Abilify MyCite):   While you are taking this drug, you will also wear a patch with a sensor that tracks when you take one of the tablets. You will also need to use a smartphone cheyanne. Be sure you know how to take the tablets, where to put the patch, other details about the patch, and how to use the smartphone cheyanne. Follow what your doctor has told you to do. Read the package insert for details. If you have questions, talk with your doctor.  Swallow tablet whole. Do not chew, break, or crush.  Injection:   It is given as a shot into a muscle.  What do I do if I miss a dose?   All oral products:   Take a missed dose as soon as you think about it.  If it is close to the time for your next dose, skip the missed dose and go back to your normal time.  Do not take 2 doses at the same time or extra doses.  Injection:   Call your doctor to find out what to do.  How do I store and/or throw out this drug?   All oral products:   Store at room temperature in a dry place. Do not store in a bathroom.  Liquid:   After opening, throw away any part not used after 6 months.  Oral-disintegrating tablet:   Use oral-disintegrating tablet right after opening. Throw away any part of opened pouch that is not used.  Injection:   If you need to store this drug at home, talk with your doctor, nurse, or pharmacist  about how to store it.  All products:   Keep all drugs in a safe place. Keep all drugs out of the reach of children and pets.  Throw away unused or  drugs. Do not flush down a toilet or pour down a drain unless you are told to do so. Check with your pharmacist if you have questions about the best way to throw out drugs. There may be drug take-back programs in your area.  General drug facts   If your symptoms or health problems do not get better or if they become worse, call your doctor.  Do not share your drugs with others and do not take anyone else's drugs.  Some drugs may have another patient information leaflet. If you have any questions about this drug, please talk with your doctor, nurse, pharmacist, or other health care provider.  This drug comes with an extra patient fact sheet called a Medication Guide. Read it with care. Read it again each time this drug is refilled. If you have any questions about this drug, please talk with the doctor, pharmacist, or other health care provider.  If you think there has been an overdose, call your poison control center or get medical care right away. Be ready to tell or show what was taken, how much, and when it happened.  Consumer Information Use and Disclaimer   This generalized information is a limited summary of diagnosis, treatment, and/or medication information. It is not meant to be comprehensive and should be used as a tool to help the user understand and/or assess potential diagnostic and treatment options. It does NOT include all information about conditions, treatments, medications, side effects, or risks that may apply to a specific patient. It is not intended to be medical advice or a substitute for the medical advice, diagnosis, or treatment of a health care provider based on the health care provider's examination and assessment of a patient's specific and unique circumstances. Patients must speak with a health care provider for complete information  "about their health, medical questions, and treatment options, including any risks or benefits regarding use of medications. This information does not endorse any treatments or medications as safe, effective, or approved for treating a specific patient. UpToDate, Inc. and its affiliates disclaim any warranty or liability relating to this information or the use thereof. The use of this information is governed by the Terms of Use, available at https://www.[employer].com/en/know/clinical-effectiveness-terms.  Last Reviewed Date   2022-12-13  Copyright   © 2024 UpToDate, Inc. and its affiliates and/or licensors. All rights reserved.  Patient Education     Pressure sores   The Basics   Written by the doctors and editors at [employer]   What are pressure sores? -- Pressures sores are areas of damage to your skin and the tissue under your skin caused by pressure. This can happen from sitting or lying in the same position for a very long time. They are also called \"pressure ulcers\" or \"bedsores.\" Doctors often call them \"pressure-induced injuries.\"  The most common places for pressure sores are where bone is close to the skin. These areas include the ankles, back, elbows, heels, and hips.  Pressure sores can also happen where something touches or rubs the skin for a long time. For example, casts, bandages, or oxygen masks can lead to sores.  What does a pressure sore look like? -- At first, the sore looks like a discolored patch of skin. For example, the skin might look red, purple, or blue. If it gets worse, the skin breaks down and the sore looks like a reddish-pink, shallow crater (picture 1). If the skin breaks down, it can become infected.  Some sores look like a blister and can burst. Some severe pressure sores are so deep, you can see muscle or bone. Deep sores can also form scabs.  Who is at risk of pressure sores? -- Pressure sores are most common in people who:   Are older   Cannot move or shift around easily " "because of a medical problem   Are in a hospital or nursing home  Can pressure sores be prevented? -- Yes. There are a few things that you can do to help prevent pressure sores.  If you spend a lot of time in a bed, you can lower your chances of getting pressure sores if you:   Turn your body at least every 2 hours - For example, turn from your back to one side, then the other side.   Lie with your upper body raised at a slight angle when you are on your side - This puts less pressure on your hip bone.   Place pillows or foam wedges between your ankles and knees - You can also place these under your ankles.  Using a special mattress or a pad that goes on top of your mattress might also help. These mattresses or pads can be made of gel, foam, air, or water. There are also special devices that automatically adjust the surface to help relieve pressure when you lie down.  If you spend a lot of time in a chair or use a wheelchair, you can lower your chances of pressure sores if you:   Do chair \"pushups\" - Use your arms to raise yourself off of the seat, then lower. Try to do this at least once every hour.   Tilt your body forward or to the side so that your bottom lifts off of the seat - Do this at least once every hour.   Use a special seat cushion - Cushions can be filled with air, gel, or foam.  Taking good care of your skin is also important for preventing pressure sores. To help keep your skin as healthy as possible, you should:   Check your skin regularly for signs of pressure sores - Have another person check areas that you cannot see or feel. (If you have nerve damage, you might not be able to feel a pressure sore forming.)   Clean your skin regularly with mild soap - Do not use hot water. Gently pat the skin dry instead of rubbing with a towel.   Use a moisturizing cream - This will help keep your skin from getting dry and flaky.   Use absorbent pads or underwear if you have problems with incontinence - " Incontinence means leaking urine or bowel movements. Using special pads or underwear can help keep your skin dry. Change them as soon as possible if they become soiled.  Should I see a doctor or nurse? -- See your doctor or nurse right away if you see or feel any areas where the surface of the skin is broken.  How are pressure sores treated? -- Treatment can include:   Medicines to treat pain   Medicines to treat infection, if your skin is infected   Special bandages to help the pressure sores heal   Surgery or other treatments to remove tissue - If there is infected or dead tissue in the pressure sore, this might need to be removed. But not everyone needs this.  Your doctor or nurse will also talk to you about any problems that might have caused your pressure sore. They might suggest changes to help prevent you from getting pressure sores in the future.  What problems should I watch for? -- Call for advice if:   You get a new pressure sore, or an existing sore gets worse.   You have signs of infection - These include:   Fever of 100.4°F (38°C) or higher, or chills   Swelling, color changes, or warmth around the sore   Yellow, green, or bloody discharge from the sore   Bad smell coming from the sore  All topics are updated as new evidence becomes available and our peer review process is complete.  This topic retrieved from [facility] on: Feb 26, 2024.  Topic 75224 Version 13.0  Release: 32.2.4 - C32.56  © 2024 UpToDate, Inc. and/or its affiliates. All rights reserved.  picture 1: Pressure sore     This picture shows a pressure sore. This can happen from sitting or lying in the same position for a very long time.  Graphic 252451 Version 2.0  Consumer Information Use and Disclaimer   Disclaimer: This generalized information is a limited summary of diagnosis, treatment, and/or medication information. It is not meant to be comprehensive and should be used as a tool to help the user understand and/or assess potential  diagnostic and treatment options. It does NOT include all information about conditions, treatments, medications, side effects, or risks that may apply to a specific patient. It is not intended to be medical advice or a substitute for the medical advice, diagnosis, or treatment of a health care provider based on the health care provider's examination and assessment of a patient's specific and unique circumstances. Patients must speak with a health care provider for complete information about their health, medical questions, and treatment options, including any risks or benefits regarding use of medications. This information does not endorse any treatments or medications as safe, effective, or approved for treating a specific patient. UpToDate, Inc. and its affiliates disclaim any warranty or liability relating to this information or the use thereof.The use of this information is governed by the Terms of Use, available at https://www.[employer] Centeruwer.com/en/know/clinical-effectiveness-terms. 2024© UpToDate, Inc. and its affiliates and/or licensors. All rights reserved.  Copyright   © 2024 UpToDate, Inc. and/or its affiliates. All rights reserved.

## 2024-12-12 ENCOUNTER — OFFICE VISIT (OUTPATIENT)
Dept: WOUND CARE | Facility: CLINIC | Age: 64
End: 2024-12-12
Payer: COMMERCIAL

## 2024-12-12 VITALS
RESPIRATION RATE: 16 BRPM | HEART RATE: 68 BPM | SYSTOLIC BLOOD PRESSURE: 142 MMHG | TEMPERATURE: 96.1 F | DIASTOLIC BLOOD PRESSURE: 68 MMHG

## 2024-12-12 DIAGNOSIS — B37.31 CANDIDIASIS, VULVA: Primary | ICD-10-CM

## 2024-12-12 PROCEDURE — 99203 OFFICE O/P NEW LOW 30 MIN: CPT | Performed by: NURSE PRACTITIONER

## 2024-12-12 PROCEDURE — 99213 OFFICE O/P EST LOW 20 MIN: CPT | Performed by: NURSE PRACTITIONER

## 2024-12-12 RX ORDER — NYSTATIN 100000 [USP'U]/G
POWDER TOPICAL 2 TIMES DAILY
Qty: 30 G | Refills: 0 | Status: SHIPPED | OUTPATIENT
Start: 2024-12-12

## 2024-12-12 NOTE — PROGRESS NOTES
Wound Procedure Treatment    Performed by: Neyda Aiken RN  Authorized by: SUSANA Recinos    Associated wounds:   Wound 12/12/24 Other (comment) Groin Right;Anterior  Wound 12/12/24 Other (comment) Groin Anterior;Left  Applied Topical: Other    Comments:  Nystatin powder applied to bilateral groin rash       Dorsal Nasal Flap Text: The defect edges were debeveled with a #15 scalpel blade.  Given the location of the defect and the proximity to free margins a dorsal nasal flap was deemed most appropriate.  Using a sterile surgical marker, an appropriate dorsal nasal flap was drawn around the defect.    The area thus outlined was incised deep to adipose tissue with a #15 scalpel blade.  The skin margins were undermined to an appropriate distance in all directions utilizing iris scissors.

## 2024-12-12 NOTE — PATIENT INSTRUCTIONS
Orders Placed This Encounter   Procedures    Wound cleansing and dressings     Bilateral groin rash:  Cleanse with mild soap and water  Rinse well with clear water  PAT DRY, do not rub  Stop moisturizing cream  Apply thin coating of nystatin powder twice daily to affected area taking care not to get powder in labial folds    Prescription sent to your pharmacy    Continue to wear boxer underwear    Discharged from wound center today- no follow up needed  May return as needed     Standing Status:   Future     Expiration Date:   12/19/2024

## 2024-12-12 NOTE — PROGRESS NOTES
Name: Collette Vaughn      : 1960      MRN: [id number]  Encounter Provider: SUSANA Recinos  Encounter Date: 2024   Encounter department: [facility]  :  Assessment & Plan  Candidiasis, vulva    Orders:    nystatin (MYCOSTATIN) powder; Apply topically 2 (two) times a day    Wound cleansing and dressings; Future    Plan:  1.  Initial visit.  No wound consult.  Patient has a rash of her groin and vulva area secondary to candidiasis.  No open wounds present.  Will prescribe nystatin powder to be applied twice daily to area.    2.  Counseled patient if rash does not improve with use of nystatin powder she should follow-up with GYN.  Patient verbalized agreement understanding    3.  Patient is discharged from the wound center today.  She may follow-up as needed    History of Present Illness   Chief Complaint   Patient presents with    No Wound Consult     Patient arrived with reddened area in bilateral groin folds present since approx 2024. Skin is intact bilaterally.   Patient 64-year-old female who presents to the  wound center for a no wound consult for rash of her bilateral groin and vulva regions.  Patient reports her rash has been present since approximately July or August of this year.  She has been using an over-the-counter cream which she reports has improved her rash, but has not fully healed the area.  She reports her rash was previously open and bleeding, however on assessment today patient has no open wounds present of her bilateral groin or vulvar regions.  She denies having the rash anywhere else on her body.  Patient endorses frequent perspiration to area.  Patient reports she is currently wearing boy short underwear to prevent elastic rubbing in area.  Patient is not diabetic.  She denies any fevers or chills.      Objective   /68   Pulse 68   Temp (!) 96.1 °F (35.6 °C)   Resp 16     Physical Exam  Vitals and nursing note  reviewed.   Constitutional:       General: She is not in acute distress.     Appearance: Normal appearance. She is normal weight.   HENT:      Head: Normocephalic and atraumatic.   Eyes:      General:         Right eye: No discharge.         Left eye: No discharge.   Pulmonary:      Effort: Pulmonary effort is normal. No respiratory distress.   Genitourinary:     Pubic Area: Rash present.      Labia:         Right: Rash present.         Left: Rash present.    Musculoskeletal:         General: Deformity present. Normal range of motion.      Cervical back: Normal range of motion and neck supple. No rigidity.      Left lower leg: No edema.      Comments: Right BKA   Skin:     General: Skin is warm and dry.      Findings: Erythema and rash present. Rash is macular and papular.   Neurological:      General: No focal deficit present.      Mental Status: She is alert and oriented to person, place, and time. Mental status is at baseline.   Psychiatric:         Mood and Affect: Mood normal.         Behavior: Behavior normal.         Thought Content: Thought content normal.         Judgment: Judgment normal.               Wound 12/12/24 Other (comment) Groin Right;Anterior (Active)   Wound Image   12/12/24 1004   Wound Description Epithelialization;Pink 12/12/24 1011   Mikayla-wound Assessment Intact;Dry 12/12/24 1011   Wound Length (cm) 0 cm 12/12/24 1011   Wound Width (cm) 0 cm 12/12/24 1011   Wound Depth (cm) 0 cm 12/12/24 1011   Wound Surface Area (cm^2) 0 cm^2 12/12/24 1011   Wound Volume (cm^3) 0 cm^3 12/12/24 1011   Calculated Wound Volume (cm^3) 0 cm^3 12/12/24 1011   Drainage Amount None 12/12/24 1011       Wound 12/12/24 Other (comment) Groin Anterior;Left (Active)   Wound Image   12/12/24 1003   Wound Description Epithelialization;Intact;Pink 12/12/24 1010   Mikayla-wound Assessment Intact;Dry 12/12/24 1010   Wound Length (cm) 0 cm 12/12/24 1010   Wound Width (cm) 0 cm 12/12/24 1010   Wound Depth (cm) 0 cm 12/12/24 1010    Wound Surface Area (cm^2) 0 cm^2 12/12/24 1010   Wound Volume (cm^3) 0 cm^3 12/12/24 1010   Calculated Wound Volume (cm^3) 0 cm^3 12/12/24 1010   Drainage Amount None 12/12/24 1010

## 2024-12-15 DIAGNOSIS — F41.9 ANXIETY AND DEPRESSION: ICD-10-CM

## 2024-12-15 DIAGNOSIS — F32.A ANXIETY AND DEPRESSION: ICD-10-CM

## 2024-12-16 RX ORDER — ARIPIPRAZOLE 2 MG/1
2 TABLET ORAL DAILY
Qty: 90 TABLET | Refills: 1 | Status: SHIPPED | OUTPATIENT
Start: 2024-12-16

## 2025-01-14 ENCOUNTER — TELEPHONE (OUTPATIENT)
Dept: FAMILY MEDICINE CLINIC | Facility: CLINIC | Age: 65
End: 2025-01-14

## 2025-02-04 DIAGNOSIS — R00.2 PALPITATION: ICD-10-CM

## 2025-02-04 RX ORDER — PROPRANOLOL HYDROCHLORIDE 40 MG/1
40 TABLET ORAL 2 TIMES DAILY
Qty: 60 TABLET | Refills: 0 | Status: SHIPPED | OUTPATIENT
Start: 2025-02-04

## 2025-02-16 ENCOUNTER — RA CDI HCC (OUTPATIENT)
Dept: OTHER | Facility: HOSPITAL | Age: 65
End: 2025-02-16

## 2025-02-17 ENCOUNTER — TELEPHONE (OUTPATIENT)
Age: 65
End: 2025-02-17

## 2025-02-17 NOTE — TELEPHONE ENCOUNTER
Patient on wait list for talk therapy services. Writer reached out to verify if Pt is still interested in service, and if would like to remain on [facility]. Spoke to Pt whom would like to be removed from [facility].    Pt would like a packet with outside resources to be mailed out to address in chart.  Will forward message to [facility] Support Services Pool.

## 2025-02-24 ENCOUNTER — OFFICE VISIT (OUTPATIENT)
Dept: FAMILY MEDICINE CLINIC | Facility: CLINIC | Age: 65
End: 2025-02-24
Payer: MEDICARE

## 2025-02-24 VITALS
OXYGEN SATURATION: 99 % | HEART RATE: 77 BPM | WEIGHT: 125 LBS | TEMPERATURE: 97.9 F | HEIGHT: 67 IN | DIASTOLIC BLOOD PRESSURE: 72 MMHG | SYSTOLIC BLOOD PRESSURE: 142 MMHG | BODY MASS INDEX: 19.62 KG/M2

## 2025-02-24 DIAGNOSIS — Z09 FOLLOW-UP EXAM: Primary | ICD-10-CM

## 2025-02-24 DIAGNOSIS — F32.A ANXIETY AND DEPRESSION: ICD-10-CM

## 2025-02-24 DIAGNOSIS — F41.9 ANXIETY AND DEPRESSION: ICD-10-CM

## 2025-02-24 DIAGNOSIS — Z89.511 HX OF RIGHT BKA (HCC): ICD-10-CM

## 2025-02-24 PROCEDURE — 99213 OFFICE O/P EST LOW 20 MIN: CPT

## 2025-02-24 NOTE — ASSESSMENT & PLAN NOTE
Current medications: Zoloft 100mg BID, Buspar 7.5mg BID, Hydroxyzine 10mg PRN , Aripiprazole 2mg.  Well controlled on current medications   Discussed lifestyle modifications and relaxation methods   Referral to behavioral health placed today   Patient aware of extended wait times   Orders:    busPIRone (BUSPAR) 7.5 mg tablet; Take 1 tablet (7.5 mg total) by mouth 2 (two) times a day

## 2025-02-24 NOTE — PROGRESS NOTES
"Name: Collette Vaughn      : 1960      MRN: [id number]  Encounter Provider: SUSANA Marques  Encounter Date: 2025   Encounter department: [facility]  :  Assessment & Plan  Follow-up exam         Anxiety and depression  Current medications: Zoloft 100mg BID, Buspar 7.5mg BID, Hydroxyzine 10mg PRN , Aripiprazole 2mg.  Well controlled on current medications   Discussed lifestyle modifications and relaxation methods   Referral to behavioral health placed today   Patient aware of extended wait times   Orders:    busPIRone (BUSPAR) 7.5 mg tablet; Take 1 tablet (7.5 mg total) by mouth 2 (two) times a day    Hx of right BKA (HCC)  24 Right BKA   Wears prosthetic on R stump               History of Present Illness   Follow up exam       Review of Systems   Constitutional:  Negative for activity change, chills, fatigue and fever.   HENT:  Negative for congestion, ear pain, rhinorrhea, sore throat and trouble swallowing.    Eyes:  Negative for pain and visual disturbance.   Respiratory:  Negative for cough, chest tightness and shortness of breath.    Cardiovascular:  Negative for chest pain, palpitations and leg swelling.   Gastrointestinal:  Negative for abdominal pain, constipation, diarrhea, nausea and vomiting.   Genitourinary:  Negative for difficulty urinating, dysuria, hematuria and urgency.   Musculoskeletal:  Negative for arthralgias and back pain.   Skin:  Negative for color change and rash.   Neurological:  Negative for dizziness, seizures, syncope and headaches.   Psychiatric/Behavioral:  Negative for dysphoric mood. The patient is not nervous/anxious.    All other systems reviewed and are negative.      Objective   /72 (BP Location: Left arm, Patient Position: Sitting, Cuff Size: Adult)   Pulse 77   Temp 97.9 °F (36.6 °C) (Temporal)   Ht 5' 7\" (1.702 m)   Wt 56.7 kg (125 lb)   SpO2 99%   BMI 19.58 kg/m²      Physical Exam  Vitals and nursing " note reviewed.   Constitutional:       General: She is not in acute distress.     Appearance: Normal appearance. She is well-developed and normal weight.   HENT:      Head: Normocephalic and atraumatic.      Right Ear: Tympanic membrane, ear canal and external ear normal. There is no impacted cerumen.      Left Ear: Tympanic membrane, ear canal and external ear normal. There is no impacted cerumen.      Nose: Nose normal.      Mouth/Throat:      Mouth: Mucous membranes are moist.      Pharynx: Oropharynx is clear.   Eyes:      Extraocular Movements: Extraocular movements intact.      Conjunctiva/sclera: Conjunctivae normal.      Pupils: Pupils are equal, round, and reactive to light.   Cardiovascular:      Rate and Rhythm: Normal rate and regular rhythm.      Pulses: Normal pulses.      Heart sounds: Normal heart sounds. No murmur heard.  Pulmonary:      Effort: Pulmonary effort is normal. No respiratory distress.      Breath sounds: Normal breath sounds.   Abdominal:      General: Bowel sounds are normal.      Palpations: Abdomen is soft.      Tenderness: There is no abdominal tenderness.   Musculoskeletal:         General: Normal range of motion.      Cervical back: Normal range of motion and neck supple.      Right lower leg: No edema.      Left lower leg: No edema.   Skin:     General: Skin is warm and dry.      Capillary Refill: Capillary refill takes less than 2 seconds.   Neurological:      General: No focal deficit present.      Mental Status: She is alert and oriented to person, place, and time. Mental status is at baseline.   Psychiatric:         Mood and Affect: Mood normal.         Behavior: Behavior normal.         Thought Content: Thought content normal.         Judgment: Judgment normal.

## 2025-02-25 RX ORDER — BUSPIRONE HYDROCHLORIDE 7.5 MG/1
7.5 TABLET ORAL 2 TIMES DAILY
Qty: 180 TABLET | Refills: 0 | Status: SHIPPED | OUTPATIENT
Start: 2025-02-25

## 2025-02-27 DIAGNOSIS — R00.2 PALPITATION: ICD-10-CM

## 2025-02-28 RX ORDER — PROPRANOLOL HYDROCHLORIDE 40 MG/1
40 TABLET ORAL 2 TIMES DAILY
Qty: 180 TABLET | Refills: 1 | Status: SHIPPED | OUTPATIENT
Start: 2025-02-28

## 2025-03-28 DIAGNOSIS — F41.9 ANXIETY AND DEPRESSION: ICD-10-CM

## 2025-03-28 DIAGNOSIS — F32.A ANXIETY AND DEPRESSION: ICD-10-CM

## 2025-03-28 RX ORDER — SERTRALINE HYDROCHLORIDE 100 MG/1
100 TABLET, FILM COATED ORAL 2 TIMES DAILY
Qty: 180 TABLET | Refills: 1 | Status: SHIPPED | OUTPATIENT
Start: 2025-03-28

## 2025-06-02 DIAGNOSIS — F32.A ANXIETY AND DEPRESSION: ICD-10-CM

## 2025-06-02 DIAGNOSIS — F41.9 ANXIETY AND DEPRESSION: ICD-10-CM

## 2025-06-02 RX ORDER — ARIPIPRAZOLE 2 MG/1
2 TABLET ORAL DAILY
Qty: 90 TABLET | Refills: 1 | Status: SHIPPED | OUTPATIENT
Start: 2025-06-02

## 2025-06-03 DIAGNOSIS — F32.A ANXIETY AND DEPRESSION: ICD-10-CM

## 2025-06-03 DIAGNOSIS — F41.9 ANXIETY AND DEPRESSION: ICD-10-CM

## 2025-06-03 RX ORDER — BUSPIRONE HYDROCHLORIDE 7.5 MG/1
7.5 TABLET ORAL 2 TIMES DAILY
Qty: 180 TABLET | Refills: 0 | Status: SHIPPED | OUTPATIENT
Start: 2025-06-03